# Patient Record
Sex: MALE | Race: WHITE | NOT HISPANIC OR LATINO | Employment: OTHER | URBAN - NONMETROPOLITAN AREA
[De-identification: names, ages, dates, MRNs, and addresses within clinical notes are randomized per-mention and may not be internally consistent; named-entity substitution may affect disease eponyms.]

---

## 2017-10-12 ENCOUNTER — TELEPHONE (OUTPATIENT)
Dept: CARDIAC SURGERY | Facility: CLINIC | Age: 72
End: 2017-10-12

## 2017-10-12 NOTE — TELEPHONE ENCOUNTER
PT CALLING TO SCHEDULE YEARLY F/U IN [place], WOULD PREFER SCAN IN [place]. PT WILL BE OUT OF TOWN 10/16/17. PLEASE BOOK SOMETIME AROUND END OF MONTH OR 1ST OF November.

## 2017-10-13 DIAGNOSIS — I71.40 ABDOMINAL AORTIC ANEURYSM (AAA) WITHOUT RUPTURE (HCC): Primary | ICD-10-CM

## 2017-10-16 DIAGNOSIS — I71.40 ABDOMINAL AORTIC ANEURYSM (AAA) WITHOUT RUPTURE (HCC): Primary | ICD-10-CM

## 2017-11-28 ENCOUNTER — OFFICE VISIT (OUTPATIENT)
Dept: CARDIAC SURGERY | Facility: CLINIC | Age: 72
End: 2017-11-28

## 2017-11-28 VITALS
HEIGHT: 72 IN | BODY MASS INDEX: 25.73 KG/M2 | DIASTOLIC BLOOD PRESSURE: 81 MMHG | WEIGHT: 190 LBS | HEART RATE: 81 BPM | SYSTOLIC BLOOD PRESSURE: 161 MMHG

## 2017-11-28 DIAGNOSIS — I71.21 ASCENDING AORTIC ANEURYSM (HCC): ICD-10-CM

## 2017-11-28 DIAGNOSIS — I71.40 ABDOMINAL AORTIC ANEURYSM WITHOUT RUPTURE (HCC): Primary | ICD-10-CM

## 2017-11-28 PROCEDURE — 99213 OFFICE O/P EST LOW 20 MIN: CPT | Performed by: THORACIC SURGERY (CARDIOTHORACIC VASCULAR SURGERY)

## 2017-12-01 ENCOUNTER — TRANSCRIBE ORDERS (OUTPATIENT)
Dept: CARDIAC SURGERY | Facility: CLINIC | Age: 72
End: 2017-12-01

## 2017-12-01 DIAGNOSIS — I71.20 THORACIC AORTIC ANEURYSM WITHOUT RUPTURE (HCC): Primary | ICD-10-CM

## 2017-12-14 ENCOUNTER — TRANSCRIBE ORDERS (OUTPATIENT)
Dept: CARDIAC SURGERY | Facility: CLINIC | Age: 72
End: 2017-12-14

## 2017-12-14 DIAGNOSIS — I71.40 ABDOMINAL AORTIC ANEURYSM (AAA) WITHOUT RUPTURE (HCC): Primary | ICD-10-CM

## 2018-01-11 ENCOUNTER — TELEPHONE (OUTPATIENT)
Dept: CARDIAC SURGERY | Facility: CLINIC | Age: 73
End: 2018-01-11

## 2018-01-11 NOTE — TELEPHONE ENCOUNTER
Mr Law called to ask about getting clearance for eye surgery. I told him that we did not do cardiac clearances but he said he thought it was for his aneurysm. I told him I would call his eye doctors office to find out what they needed. He gave me the number and told me to speak to Janice. I talked to Janice ([phone]) and she confirmed that they needed cardiac clearance. I called Mr Law back, he stated that he would make an appointment with his cardiologist.

## 2018-01-15 ENCOUNTER — TRANSCRIBE ORDERS (OUTPATIENT)
Dept: CARDIAC SURGERY | Facility: CLINIC | Age: 73
End: 2018-01-15

## 2018-01-15 DIAGNOSIS — I71.40 ABDOMINAL AORTIC ANEURYSM (AAA) WITHOUT RUPTURE (HCC): Primary | ICD-10-CM

## 2018-05-09 ENCOUNTER — TELEPHONE (OUTPATIENT)
Dept: CARDIAC SURGERY | Facility: CLINIC | Age: 73
End: 2018-05-09

## 2018-05-15 ENCOUNTER — TELEPHONE (OUTPATIENT)
Dept: CARDIAC SURGERY | Facility: CLINIC | Age: 73
End: 2018-05-15

## 2018-05-15 NOTE — TELEPHONE ENCOUNTER
Pt called regarding recall letter. Verified pt's address, phone number and insurance. Pt would like to be seen in [place]

## 2018-05-25 DIAGNOSIS — I71.20 THORACIC AORTIC ANEURYSM WITHOUT RUPTURE (HCC): Primary | ICD-10-CM

## 2018-07-03 ENCOUNTER — OFFICE VISIT (OUTPATIENT)
Dept: CARDIAC SURGERY | Facility: CLINIC | Age: 73
End: 2018-07-03

## 2018-07-03 VITALS
BODY MASS INDEX: 26.14 KG/M2 | DIASTOLIC BLOOD PRESSURE: 90 MMHG | OXYGEN SATURATION: 96 % | SYSTOLIC BLOOD PRESSURE: 150 MMHG | HEIGHT: 72 IN | HEART RATE: 77 BPM | TEMPERATURE: 98.9 F | WEIGHT: 193 LBS

## 2018-07-03 DIAGNOSIS — I71.21 ASCENDING AORTIC ANEURYSM (HCC): Primary | ICD-10-CM

## 2018-07-03 PROCEDURE — 99214 OFFICE O/P EST MOD 30 MIN: CPT | Performed by: PHYSICIAN ASSISTANT

## 2018-07-03 PROCEDURE — 99406 BEHAV CHNG SMOKING 3-10 MIN: CPT | Performed by: PHYSICIAN ASSISTANT

## 2018-07-03 NOTE — PROGRESS NOTES
07/03/2018  Patient Information  Segundo Law                                                                                          [address]  [place] [address]   1945  'PCP/Referring Physician'  NATALY Mcarthur MD  [phone]  No ref. provider found    Chief Complaint   Patient presents with   • Follow-up     6 MO FU with CT Chest for TAA   • Thoracic Aneurysm       History of Present Illness:  Patient is a 72-year-old  male with history of hypertension, hyperlipidemia, atrial fibrillation, chronic kidney disease with renal cancer and left nephrectomy, coronary artery disease status post CABG/stents, redo endovascular AAA repair and thoracic aortic aneurysm who presents to office today for interval follow-up with CT scan of the chest.  Mr. Law was last seen on 11/28/17 and complains of interval epigastric pain, shortness of breath and lower back pain, but denies any chest pain, hemoptysis or weight loss.  The patient is down to 6 cigarettes per day, but he and I had a 3-5 minute conversation about the importance of tobacco cessation.  The patient's previous measurement of his thoracic aortic aneurysm was 4.3 cm.  I personally reviewed his most recent CT scan, and discussed the results with he and his wife.  The patient is otherwise doing well.    Patient Active Problem List   Diagnosis   • Abdominal aortic aneurysm without rupture status post repeat endograft 7/20/2016   • PVD (peripheral vascular disease) (CMS/HCC)   • MUNIR (acute kidney injury)/ on chronic kidney disease   • GERD (gastroesophageal reflux disease)   • Tobacco abuse   • Chronic Atrial fibrillation on Pradaxa prior to admission   • Renal cell cancer with previous left nephrectomy   • Coronary artery disease with previous CABG and stents   • Acute blood loss anemia postop now stable   • Ascending aortic aneurysm (CMS/HCC)     Past Medical History:   Diagnosis Date   • Acute confusion due to medical condition      some confusion after lass surgery while in icu    • Aneurysm (CMS/HCC)     S/p redo Endovascular repair 7/20/16   • Atrial fibrillation (CMS/HCC)    • Cancer (CMS/HCC)     Renal cell carcinoma   • Coronary artery disease    • Dyslipidemia    • Enlarged prostate    • Full dentures    • Hearing loss of both ears     from work    • Heart burn    • Hypertension    • Kidney disease     only one kidney    • Pneumonia     h/o   • Wears glasses      Past Surgical History:   Procedure Laterality Date   • ABDOMINAL AORTIC ANEURYSM REPAIR WITH ENDOGRAFT      Endologic graft, 7/17/12 in [place], Dr. Ej Spring   • AORTAGRAM N/A 7/20/2016    Procedure: Aortogram with possible stenting ;  Surgeon: Lefty Villegas MD;  Location: [facility] HYBRID OR 15;  Service:    • ARTERIAL ANEURYSM REPAIR     • CHOLECYSTECTOMY     • COLONOSCOPY     • CORONARY ARTERY BYPASS GRAFT      x2   • CORONARY STENT PLACEMENT      1 stent    • ENDOSCOPY     • NEPHRECTOMY Left    • [place] AAA REPAIR,AORTO-AORTIC TUBE PROSTH N/A 7/20/2016    Procedure: Abdominal Aortic Aneurysm Repair With Endograft;  Surgeon: Lefty Villegas MD;  Location:  [facility] HYBRID OR 15;  Service: Vascular   • TONSILLECTOMY         Current Outpatient Prescriptions:   •  carvedilol (COREG) 12.5 MG tablet, Take 12.5 mg by mouth 2 (two) times a day., Disp: , Rfl: 0  •  CRESTOR 10 MG tablet, Take 10 mg by mouth daily., Disp: , Rfl: 0  •  diazepam (VALIUM) 5 MG tablet, Take 5 mg by mouth every 6 (six) hours as needed for anxiety., Disp: , Rfl: 0  •  DIGITEK 250 MCG tablet, Take 125 mcg by mouth every day., Disp: , Rfl: 0  •  fenofibrate (TRICOR) 145 MG tablet, Take 145 mg by mouth daily., Disp: , Rfl: 0  •  omega-3 acid ethyl esters (LOVAZA) 1 G capsule, Take 1 g by mouth daily., Disp: , Rfl: 0  •  PRADAXA 75 MG capsule, Take 75 mg by mouth 2 (two) times a day. Instructed to stop 7/18 per dr villegas office , Disp: , Rfl: 0  •  ranitidine (ZANTAC) 150 MG tablet, Take 150 mg by mouth daily as  needed for heartburn., Disp: , Rfl: 0  Allergies   Allergen Reactions   • Nexium [Esomeprazole Magnesium] Other (See Comments)     Rapid Heartrate and A-Fib   • Adhesive Tape Rash   • Latex Rash     Social History     Social History   • Marital status:      Spouse name: N/A   • Number of children: 2   • Years of education: N/A     Occupational History   • Retired       Social History Main Topics   • Smoking status: Current Every Day Smoker     Packs/day: 0.50     Years: 55.00     Types: Cigarettes   • Smokeless tobacco: Never Used      Comment: Currently 6 Cigarettes Daily   • Alcohol use No   • Drug use: No   • Sexual activity: Not on file     Other Topics Concern   • Not on file     Social History Narrative    Lives in [place]     Family History   Problem Relation Age of Onset   • Heart failure Mother    • Other Father         was murdered in 1971     Review of Systems   Constitution: Negative for chills, fever, malaise/fatigue, night sweats and weight loss.   HENT: Positive for hearing loss. Negative for odynophagia and sore throat.    Cardiovascular: Positive for dyspnea on exertion. Negative for chest pain, leg swelling, orthopnea and palpitations.   Respiratory: Negative for cough and hemoptysis.    Endocrine: Negative for cold intolerance, heat intolerance, polydipsia, polyphagia and polyuria.   Hematologic/Lymphatic: Bruises/bleeds easily.   Skin: Negative for itching and rash.   Musculoskeletal: Positive for back pain. Negative for joint pain, joint swelling and myalgias.   Gastrointestinal: Positive for abdominal pain. Negative for constipation, diarrhea, hematemesis, hematochezia, melena, nausea and vomiting.   Genitourinary: Negative for dysuria, frequency and hematuria.   Neurological: Negative for focal weakness, headaches, numbness and seizures.   Psychiatric/Behavioral: Negative for suicidal ideas. The patient is nervous/anxious.    All other systems reviewed and are  "negative.    Vitals:    18 1423   BP: 150/90   BP Location: Left arm   Patient Position: Sitting   Pulse: 77   Temp: 98.9 °F (37.2 °C)   SpO2: 96%   Weight: 87.5 kg (193 lb)   Height: 182.9 cm (72\")      Physical Exam:  Gen - NAD, pleasant, cooperative  CV - Regular rate and rhythm, no murmur gallop or rub  Pulm - Lungs clear to auscultation without wheeze or rhonchi   GI - Soft, normoactive bowel sounds, non-tender  Ext - Without edema    Labs/Imagin18 CT chest without contrast  The ascending thoracic aorta measures up to 4.8 cm in greatest axial dimension, which is larger than prior CT report from outside institution.    Assessment/Plan:  Patient is a 72-year-old  male with history of hypertension, hyperlipidemia, atrial fibrillation, chronic kidney disease with renal cancer and left nephrectomy, coronary artery disease status post CABG/stents, redo endovascular AAA repair and thoracic aortic aneurysm who presents to office today for interval follow-up with CT scan of the chest.  I personally reviewed the patient's CT chest scan, which was performed on 18 at [facility], which revealed a 5 mm interval increase in thoracic aortic aneurysm from 4.3 to 4.8cm per Radiology report.  I personally measured what I believed to be the greatest axial dimension of the thoracic aorta, and my measurement was closer to 4.5 cm, which means the interval increase is actually closer to 2 mm.  I discussed this with the patient and his wife.  Regardless, the patient does not meet criteria for repair.  I would like for the patient to follow-up with our office in 6 months with CT scan of the chest, abdomen and pelvis.  If he develops any acutely worsening chest pain, upper back pain or abdominal pain he should present to the nearest emergency department immediately.  I would like for him to discuss his cardiovascular medications with his primary care provider, as his blood pressure is elevated at 150/90 " today.  I also encouraged him to keep a daily log of his blood pressure at home.  I discouraged the patient from straining or lifting anything heavier than 20 pounds.  The patient will follow-up in 6 months, if he has any questions or concerns during the interval, he may call our office at any time.        Patient Active Problem List   Diagnosis   • Abdominal aortic aneurysm without rupture status post repeat endograft 7/20/2016   • PVD (peripheral vascular disease) (CMS/[facility])   • MUNIR (acute kidney injury)/ on chronic kidney disease   • GERD (gastroesophageal reflux disease)   • Tobacco abuse   • Chronic Atrial fibrillation on Pradaxa prior to admission   • Renal cell cancer with previous left nephrectomy   • Coronary artery disease with previous CABG and stents   • Acute blood loss anemia postop now stable   • Ascending aortic aneurysm (CMS/[facility])     Signed by:

## 2019-02-11 ENCOUNTER — TELEPHONE (OUTPATIENT)
Dept: CARDIAC SURGERY | Facility: CLINIC | Age: 74
End: 2019-02-11

## 2019-03-21 ENCOUNTER — TELEPHONE (OUTPATIENT)
Dept: CARDIAC SURGERY | Facility: CLINIC | Age: 74
End: 2019-03-21

## 2019-03-21 NOTE — TELEPHONE ENCOUNTER
3rd attempt phone call for 6m f/u with Dr. Villegas. Pt stated his wife is sick and will call us when he is able to make an apt.

## 2019-07-10 ENCOUNTER — TELEPHONE (OUTPATIENT)
Dept: CARDIAC SURGERY | Facility: CLINIC | Age: 74
End: 2019-07-10

## 2019-07-15 DIAGNOSIS — I71.40 ABDOMINAL AORTIC ANEURYSM WITHOUT RUPTURE (HCC): Primary | ICD-10-CM

## 2019-07-15 DIAGNOSIS — I71.21 ASCENDING AORTIC ANEURYSM (HCC): Primary | ICD-10-CM

## 2019-09-03 ENCOUNTER — OFFICE VISIT (OUTPATIENT)
Dept: CARDIAC SURGERY | Facility: CLINIC | Age: 74
End: 2019-09-03

## 2019-09-03 VITALS
SYSTOLIC BLOOD PRESSURE: 146 MMHG | WEIGHT: 186 LBS | HEIGHT: 72 IN | DIASTOLIC BLOOD PRESSURE: 77 MMHG | OXYGEN SATURATION: 98 % | TEMPERATURE: 98.2 F | HEART RATE: 69 BPM | BODY MASS INDEX: 25.19 KG/M2

## 2019-09-03 DIAGNOSIS — I71.40 ABDOMINAL AORTIC ANEURYSM WITHOUT RUPTURE (HCC): Primary | ICD-10-CM

## 2019-09-03 PROCEDURE — 99406 BEHAV CHNG SMOKING 3-10 MIN: CPT | Performed by: PHYSICIAN ASSISTANT

## 2019-09-03 PROCEDURE — 99213 OFFICE O/P EST LOW 20 MIN: CPT | Performed by: PHYSICIAN ASSISTANT

## 2019-09-03 NOTE — PROGRESS NOTES
09/03/2019  Patient Information  Segundo MARTINEZ  [place] [address]   1945  'PCP/Referring Physician'  Segundo Mcarthur MD  [phone]  No ref. provider found    Chief Complaint   Patient presents with   • Aortic Aneurysm     1 year follow-up with results from CT abdomen/pelvis for abdominal aortic aneurysm       History of Present Illness:  Patient is a 73-year-old  male with history of hypertension, hyperlipidemia, atrial fibrillation, chronic kidney disease with renal cancer and left nephrectomy, coronary artery disease status post CABG/stents, redo endovascular AAA repair and thoracic aortic aneurysm who presents to office for review and discussion of recent CT scan of the chest, abdomen and pelvis.  The patient was last seen on 7/3/2018 and denies any chest pain, difficulty with ambulation or hemoptysis but does complain of occasional abdominal and back pain as well as shortness of breath with exertion.  The patient also notes unintentional weight loss of 8 to 10 pounds since his previous office visit.  The patient continues to smoke 0.5 packs of cigarettes per day and is otherwise doing well.  His blood pressure is 146/77 today, for which I encouraged him to discuss this elevated blood pressure with his primary care provider.    Patient Active Problem List   Diagnosis   • Abdominal aortic aneurysm without rupture status post repeat endograft 7/20/2016   • PVD (peripheral vascular disease) (CMS/HCC)   • MUNIR (acute kidney injury)/ on chronic kidney disease   • GERD (gastroesophageal reflux disease)   • Tobacco abuse   • Chronic Atrial fibrillation on Pradaxa prior to admission   • Renal cell cancer with previous left nephrectomy   • Coronary artery disease with previous CABG and stents   • Acute blood loss anemia postop now stable   • Ascending aortic aneurysm (CMS/HCC)     Past  Medical History:   Diagnosis Date   • Acute confusion due to medical condition     some confusion after lass surgery while in [facility]    • Aneurysm (CMS/HCC)     S/p redo Endovascular repair 7/20/16   • Atrial fibrillation (CMS/HCC)    • Cancer (CMS/HCC)     Renal cell carcinoma   • Coronary artery disease    • Dyslipidemia    • Enlarged prostate    • Full dentures    • Hearing loss of both ears     from work    • Heart burn    • Hypertension    • Kidney disease     only one kidney    • Pneumonia     h/o   • Wears glasses      Past Surgical History:   Procedure Laterality Date   • ABDOMINAL AORTIC ANEURYSM REPAIR WITH ENDOGRAFT      Endologic graft, 7/17/12 in [place], Dr. Ej Spring   • AORTAGRAM N/A 7/20/2016    Procedure: Aortogram with possible stenting ;  Surgeon: Lefty Villegas MD;  Location: [facility] OR ;  Service:    • ARTERIAL ANEURYSM REPAIR     • CHOLECYSTECTOMY     • COLONOSCOPY     • CORONARY ARTERY BYPASS GRAFT      x2   • CORONARY STENT PLACEMENT      1 stent    • ENDOSCOPY     • NEPHRECTOMY Left    • [place] AAA REPAIR,AORTO-AORTIC TUBE PROSTH N/A 7/20/2016    Procedure: Abdominal Aortic Aneurysm Repair With Endograft;  Surgeon: Lefty Villegas MD;  Location: [facility] OR ;  Service: Vascular   • TONSILLECTOMY         Current Outpatient Medications:   •  carvedilol (COREG) 12.5 MG tablet, Take 12.5 mg by mouth 2 (two) times a day., Disp: , Rfl: 0  •  CRESTOR 10 MG tablet, Take 10 mg by mouth daily., Disp: , Rfl: 0  •  DIGITEK 250 MCG tablet, Take 125 mcg by mouth every day., Disp: , Rfl: 0  •  fenofibrate (TRICOR) 145 MG tablet, Take 145 mg by mouth daily., Disp: , Rfl: 0  •  PRADAXA 75 MG capsule, Take 75 mg by mouth 2 (two) times a day. Instructed to stop 7/18 per dr villegas office , Disp: , Rfl: 0  •  ranitidine (ZANTAC) 150 MG tablet, Take 150 mg by mouth daily as needed for heartburn., Disp: , Rfl: 0  •  diazepam (VALIUM) 5 MG tablet, Take 5 mg by mouth every 6 (six) hours as needed for  anxiety., Disp: , Rfl: 0  •  omega-3 acid ethyl esters (LOVAZA) 1 G capsule, Take 1 g by mouth daily., Disp: , Rfl: 0  Allergies   Allergen Reactions   • Nexium [Esomeprazole Magnesium] Other (See Comments)     Rapid Heartrate and A-Fib   • Adhesive Tape Rash   • Latex Rash     Social History     Socioeconomic History   • Marital status:      Spouse name: Not on file   • Number of children: 2   • Years of education: Not on file   • Highest education level: Not on file   Occupational History   • Occupation: Retired    Tobacco Use   • Smoking status: Current Every Day Smoker     Packs/day: 0.50     Years: 55.00     Pack years: 27.50     Types: Cigarettes   • Smokeless tobacco: Never Used   • Tobacco comment: Currently 6 Cigarettes Daily   Substance and Sexual Activity   • Alcohol use: No   • Drug use: No   Social History Narrative    Lives in [place]     Family History   Problem Relation Age of Onset   • Heart failure Mother    • Other Father         was murdered in 1971     Review of Systems   Constitution: Negative for chills, fever, malaise/fatigue, night sweats and weight loss.   HENT: Negative for hearing loss, odynophagia and sore throat.    Cardiovascular: Positive for dyspnea on exertion. Negative for chest pain, leg swelling, orthopnea and palpitations.   Respiratory: Negative for cough and hemoptysis.    Endocrine: Negative for cold intolerance, heat intolerance, polydipsia, polyphagia and polyuria.   Hematologic/Lymphatic: Bruises/bleeds easily.   Skin: Negative for itching and rash.   Musculoskeletal: Positive for back pain and muscle cramps. Negative for joint pain, joint swelling and myalgias.   Gastrointestinal: Positive for abdominal pain and diarrhea. Negative for constipation, hematemesis, hematochezia, melena, nausea and vomiting.   Genitourinary: Negative for dysuria, frequency and hematuria.   Neurological: Negative for focal weakness, headaches, numbness and seizures.  "  Psychiatric/Behavioral: Positive for depression. Negative for suicidal ideas. The patient is not nervous/anxious.    All other systems reviewed and are negative.    Vitals:    09/03/19 1212   BP: 146/77   BP Location: Right arm   Patient Position: Sitting   Pulse: 69   Temp: 98.2 °F (36.8 °C)   TempSrc: Temporal   SpO2: 98%   Weight: 84.4 kg (186 lb)   Height: 182.9 cm (72\")      Physical Exam:  Gen - NAD, pleasant, cooperative  CV - Regular rate and rhythm, no murmur gallop or rub  Pulm - Lungs clear to auscultation without wheeze or rhonchi   GI - Soft, normoactive bowel sounds, non-tender  Ext - Without edema  Neuro - CN II - XII grossly intact, tongue midline, voice normal    Labs/Imaging:  CT chest without contrast performed 7/18/2019 at [facility]  Ascending thoracic aortic aneurysm measured at 4.8 cm in the greatest axial dimension.  Not significantly changed since prior exam.  No definitive evidence of leak or dissection on this noncontrast exam.    CT scan abdomen/pelvis performed 7/18/2019 at [facility]  Patient is status post aortobiiliac grafting with a large infrarenal abdominal aortic aneurysm measuring up to 6.7 cm in greatest axial dimension.  This is slightly larger than the prior exam.  There is no evidence of leak on this noncontrast exam.  Of note, the prostate is enlarged.  Recommend correlation with serum PSA and physical exam.    Assessment/Plan:  Patient is a 73-year-old  male with history of hypertension, hyperlipidemia, atrial fibrillation, chronic kidney disease with renal cancer and left nephrectomy, coronary artery disease status post CABG/stents, redo endovascular AAA repair and thoracic aortic aneurysm who presents to office for review and discussion of recent CT scan of the chest, abdomen and pelvis.  The patient has been doing well since last being seen in office.  I personally reviewed his recent CT scan of the chest, abdomen and pelvis, and " discussed the results with the patient, answered all questions to his satisfaction.  Due to the fact that his scans were noncontrast, I would like for the patient to receive a CMP to check his creatinine and move forward with a CT scan of the abdomen/pelvis with contrast, if his creatinine is reasonable.  I spent 3 to 5 minutes discussing the importance of tobacco cessation with the patient.  He will follow-up with our office after his CT scan of the abdomen/pelvis has been performed, if his creatinine is again, within reason.  The patient should call with any questions, concerns or acutely worsening symptoms, should the patient develop any prior to being seen in our office.    Patient Active Problem List   Diagnosis   • Abdominal aortic aneurysm without rupture status post repeat endograft 7/20/2016   • PVD (peripheral vascular disease) (CMS/HCC)   • MUNIR (acute kidney injury)/ on chronic kidney disease   • GERD (gastroesophageal reflux disease)   • Tobacco abuse   • Chronic Atrial fibrillation on Pradaxa prior to admission   • Renal cell cancer with previous left nephrectomy   • Coronary artery disease with previous CABG and stents   • Acute blood loss anemia postop now stable   • Ascending aortic aneurysm (CMS/HCC)

## 2019-09-19 DIAGNOSIS — Z00.6 EXAMINATION FOR NORMAL COMPARISON FOR CLINICAL RESEARCH: Primary | ICD-10-CM

## 2019-10-29 ENCOUNTER — OFFICE VISIT (OUTPATIENT)
Dept: CARDIAC SURGERY | Facility: CLINIC | Age: 74
End: 2019-10-29

## 2019-10-29 VITALS
DIASTOLIC BLOOD PRESSURE: 97 MMHG | OXYGEN SATURATION: 99 % | WEIGHT: 188 LBS | HEIGHT: 72 IN | TEMPERATURE: 98.2 F | SYSTOLIC BLOOD PRESSURE: 141 MMHG | HEART RATE: 70 BPM | BODY MASS INDEX: 25.47 KG/M2

## 2019-10-29 DIAGNOSIS — I71.40 ABDOMINAL AORTIC ANEURYSM WITHOUT RUPTURE (HCC): Primary | ICD-10-CM

## 2019-10-29 PROCEDURE — 99212 OFFICE O/P EST SF 10 MIN: CPT | Performed by: PHYSICIAN ASSISTANT

## 2019-10-29 RX ORDER — DIGOXIN 125 UG/1
125 TABLET ORAL
Refills: 0 | COMMUNITY
Start: 2019-09-14 | End: 2022-08-11

## 2019-10-29 RX ORDER — TAMSULOSIN HYDROCHLORIDE 0.4 MG/1
1 CAPSULE ORAL DAILY
Refills: 0 | COMMUNITY
Start: 2019-10-17 | End: 2019-11-22

## 2019-10-29 NOTE — PROGRESS NOTES
10/29/2019  Patient Information  Segundo Stinson [address]  [place] [address]   1945  'PCP/Referring Physician'  Segundo Mcarthur MD  [phone]  No ref. provider found    Chief Complaint   Patient presents with   • Follow-up     Follow up after CT Abdomen / Pelvis for AAA   • Aortic Aneurysm       History of Present Illness:  Patient is a 73-year-old  male with history of hypertension, hyperlipidemia, atrial fibrillation, chronic kidney disease with renal cancer and left nephrectomy, coronary artery disease status post CABG/stents, redo endovascular AAA repair and thoracic aortic aneurysm who presents to office for review and discussion of recent CT scan of the abdomen and pelvis.  The patient was last seen on 9/3/2019 and denies any chest pain, difficulty with ambulation or hemoptysis but does complain of occasional abdominal and back pain as well as shortness of breath with exertion.  Otherwise he is doing well.    Patient Active Problem List   Diagnosis   • Abdominal aortic aneurysm without rupture status post repeat endograft 7/20/2016   • PVD (peripheral vascular disease) (CMS/HCC)   • MUNIR (acute kidney injury)/ on chronic kidney disease   • GERD (gastroesophageal reflux disease)   • Tobacco abuse   • Chronic Atrial fibrillation on Pradaxa prior to admission   • Renal cell cancer with previous left nephrectomy   • Coronary artery disease with previous CABG and stents   • Acute blood loss anemia postop now stable   • Ascending aortic aneurysm (CMS/HCC)     Past Medical History:   Diagnosis Date   • Acute confusion due to medical condition     some confusion after lass surgery while in icu    • Aneurysm (CMS/HCC)     S/p redo Endovascular repair 7/20/16   • Atrial fibrillation (CMS/HCC)    • Cancer (CMS/HCC)     Renal cell carcinoma   • Coronary artery disease    • Dyslipidemia    • Enlarged  prostate    • Full dentures    • Hearing loss of both ears     from work    • Heart burn    • Hypertension    • Kidney disease     only one kidney    • Pneumonia     h/o   • Wears glasses      Past Surgical History:   Procedure Laterality Date   • ABDOMINAL AORTIC ANEURYSM REPAIR WITH ENDOGRAFT      Endologic graft, 7/17/12 in [place], Dr. Ej Spring   • AORTAGRAM N/A 7/20/2016    Procedure: Aortogram with possible stenting ;  Surgeon: Lefty Villegas MD;  Location: [facility] OR ;  Service:    • ARTERIAL ANEURYSM REPAIR     • CHOLECYSTECTOMY     • COLONOSCOPY     • CORONARY ARTERY BYPASS GRAFT      x2   • CORONARY STENT PLACEMENT      1 stent    • ENDOSCOPY     • NEPHRECTOMY Left    • MA AAA REPAIR,AORTO-AORTIC TUBE PROSTH N/A 7/20/2016    Procedure: Abdominal Aortic Aneurysm Repair With Endograft;  Surgeon: Lefty Villegas MD;  Location: [facility] OR 15;  Service: Vascular   • TONSILLECTOMY         Current Outpatient Medications:   •  carvedilol (COREG) 12.5 MG tablet, Take 12.5 mg by mouth 2 (two) times a day., Disp: , Rfl: 0  •  CRESTOR 10 MG tablet, Take 10 mg by mouth daily., Disp: , Rfl: 0  •  diazepam (VALIUM) 5 MG tablet, Take 5 mg by mouth every 6 (six) hours as needed for anxiety., Disp: , Rfl: 0  •  DIGOX 125 MCG tablet, , Disp: , Rfl: 0  •  fenofibrate (TRICOR) 145 MG tablet, Take 145 mg by mouth daily., Disp: , Rfl: 0  •  omega-3 acid ethyl esters (LOVAZA) 1 G capsule, Take 1 g by mouth daily., Disp: , Rfl: 0  •  PRADAXA 75 MG capsule, Take 75 mg by mouth 2 (two) times a day. Instructed to stop 7/18 per dr villegas office , Disp: , Rfl: 0  •  ranitidine (ZANTAC) 150 MG tablet, Take 150 mg by mouth daily as needed for heartburn., Disp: , Rfl: 0  •  DIGITEK 250 MCG tablet, Take 125 mcg by mouth every day., Disp: , Rfl: 0  •  tamsulosin (FLOMAX) 0.4 MG capsule 24 hr capsule, Take 1 capsule by mouth Daily., Disp: , Rfl: 0  Allergies   Allergen Reactions   • Nexium [Esomeprazole Magnesium] Other  (See Comments)     Rapid Heartrate and A-Fib   • Adhesive Tape Rash   • Latex Rash     Social History     Socioeconomic History   • Marital status:      Spouse name: Not on file   • Number of children: 2   • Years of education: Not on file   • Highest education level: Not on file   Occupational History   • Occupation: Retired    Tobacco Use   • Smoking status: Current Every Day Smoker     Packs/day: 0.50     Years: 55.00     Pack years: 27.50     Types: Cigarettes   • Smokeless tobacco: Never Used   • Tobacco comment: Currently 6 Cigarettes Daily   Substance and Sexual Activity   • Alcohol use: No   • Drug use: No   Social History Narrative    Lives in [place]     Family History   Problem Relation Age of Onset   • Heart failure Mother    • Other Father         was murdered in 1971     Review of Systems   Constitution: Negative for chills, diaphoresis, fever, malaise/fatigue and weight loss.   HENT: Negative for congestion, hearing loss, hoarse voice and sore throat.    Eyes: Negative for blurred vision and double vision.   Cardiovascular: Positive for claudication, dyspnea on exertion and leg swelling. Negative for chest pain, palpitations and syncope.   Respiratory: Negative for cough, hemoptysis, shortness of breath and wheezing.    Hematologic/Lymphatic: Bruises/bleeds easily.   Skin: Negative for itching, poor wound healing and rash.   Musculoskeletal: Positive for arthritis, back pain, muscle weakness and neck pain. Negative for falls, joint pain, joint swelling and muscle cramps.   Gastrointestinal: Positive for abdominal pain. Negative for constipation, diarrhea, hematemesis, nausea and vomiting.   Genitourinary: Negative for dysuria, frequency, hematuria, hesitancy, incomplete emptying and urgency.   Neurological: Negative for dizziness, headaches, light-headedness, loss of balance, numbness and vertigo.   Psychiatric/Behavioral: Negative for depression, memory loss and substance abuse.  "The patient is not nervous/anxious.    Allergic/Immunologic: Negative for environmental allergies and HIV exposure.     Vitals:    10/29/19 0935   BP: 141/97   Pulse: 70   Temp: 98.2 °F (36.8 °C)   TempSrc: Temporal   SpO2: 99%   Weight: 85.3 kg (188 lb)   Height: 182.9 cm (72\")      Physical Exam  Gen - NAD, pleasant, cooperative  CV -regular rate and rhythm, no murmur gallop or rub  Pulm -lungs clear to auscultation bilateral without wheeze or rhonchi  GI -soft, normoactive bowel sounds, nontender  Ext - without edema.   Incision -healing well, no evidence of incisional dehiscence or cellulitis  Neuro - CN II - XII grossly intact, tongue midline, voice normal    Labs/Imaging:  CT abdomen/pelvis 9/19/2019  Stent in place.  Stable 6.8 cm infrarenal AAA.    Assessment/Plan:  Patient is a 73-year-old  male with history of hypertension, hyperlipidemia, atrial fibrillation, chronic kidney disease with renal cancer and left nephrectomy, coronary artery disease status post CABG/stents, redo endovascular AAA repair and thoracic aortic aneurysm who presents to office for review and discussion of recent CT scan of the abdomen and pelvis.  Results of the scan were discussed in detail with the patient in office today and all questions were answered to his satisfaction.  At this time we will plan to see the patient back in office again in 1 year with a repeat CT scan of the chest and abdomen/pelvis for continued surveillance of his AAA repair and is ascending aortic aneurysm.  He may call our office at any time with any questions or concerns should any arise during the interval.    Patient Active Problem List   Diagnosis   • Abdominal aortic aneurysm without rupture status post repeat endograft 7/20/2016   • PVD (peripheral vascular disease) (CMS/HCC)   • MUNIR (acute kidney injury)/ on chronic kidney disease   • GERD (gastroesophageal reflux disease)   • Tobacco abuse   • Chronic Atrial fibrillation on Pradaxa prior to " admission   • Renal cell cancer with previous left nephrectomy   • Coronary artery disease with previous CABG and stents   • Acute blood loss anemia postop now stable   • Ascending aortic aneurysm (CMS/HCC)

## 2019-11-21 PROBLEM — I10 ESSENTIAL HYPERTENSION: Status: ACTIVE | Noted: 2019-11-21

## 2019-11-21 PROBLEM — H91.93 HEARING LOSS OF BOTH EARS: Status: ACTIVE | Noted: 2019-11-21

## 2019-11-21 PROBLEM — E78.2 MIXED HYPERLIPIDEMIA: Status: ACTIVE | Noted: 2019-11-21

## 2019-11-21 PROBLEM — N18.4 CKD (CHRONIC KIDNEY DISEASE) STAGE 4, GFR 15-29 ML/MIN (HCC): Status: ACTIVE | Noted: 2019-11-21

## 2019-11-21 NOTE — PROGRESS NOTES
"      Subjective   Segundo Law is a 73 y.o. male.  Primary Care: Gen, Nikolay Stout MD  Referring: Lefty Villegas MD  [address] [facility]  [place] [address] [place], [place] [address]      Chief Complaint   Patient presents with   • Atrial Fibrillation   • Chest Pain   • Dizziness   • Shortness of Breath     Patient Active Problem List    Diagnosis    • Angina, class III (CMS/HCC)    • Coronary artery disease with previous CABG and stent.    • PVD (peripheral vascular disease) with claudication (CMS/HCC)    • Permanent atrial fibrillation    • Accelerated hypertension    • Mixed hyperlipidemia    • Ascending aortic aneurysm (CMS/HCC)    • Tobacco abuse    • Abdominal aortic aneurysm without rupture status post repeat endograft 7/20/2016      S/p repeat endograft AAA 7/20/16 with reexploration for bleeding  Previous endograft July 2012 in [place]   • CKD (chronic kidney disease) stage 3, GFR 30-59 ml/min (CMS/HCC)    • Hearing loss of both ears    • GERD (gastroesophageal reflux disease)    • Renal cell cancer with previous left nephrectomy      S/p left nephrectomy. Baseline creatinine 2.5-3.0.     History of Present Illness   This is a 73-year-old hypertensive dyslipidemic male smoker with extensive cardiac history to include coronary artery bypass grafting in 1981 with redo bypass grafting in 1985 with a subsequent stent to unknown vessel circa 2001. He has had no ischemic studies since cardiac catheterization in 2001. He also has permanent atrial fibrillation, history of abdominal aortic aneurysm repair and a redo repair and further has an ascending thoracic aneurysm which is being monitored by CT surgery.  He presents to our office today for evaluation of accelerated hypertension.  He presented to [facility] emergency department last week with a blood pressure reported to be as high as 279 mmHg.  His wife tells me that \"all his medicines were doubled\".  Norvasc 5 mg daily was also added to his regimen.  Since " these changes he continues to have systolic blood pressures ranging from 160 to 170 mmHg with a single reading as low as 139 mmHg.  He is complaining of blurring of vision and swelling of both his lower extremities and eyes with onset prior to starting amlodipine.  He complains of exertional midsternal chest pressure which is moderate in intensity with ambulation of less than 50 feet.  He also has bilateral lower extremity claudication at the same distance.    He has permanent atrial fibrillation for which he is managed on digoxin and anticoagulated with Pradaxa.  He has no awareness of tachycardia no dizziness or syncope.  His wife reports that his heart rate generally runs between 80 to 90 bpm.    He has a history of renal carcinoma with subsequent nephrectomy and chronic kidney disease stage III-IV.  He has a nephrologist who he sees regularly.    Summary records reviewed:  · Emergency department note dated 11/15/2019 was reviewed at which time he presented with a complaint of high blood pressure generally running approximately 135/70 but reading 279/124 the day prior to presentation.  He reported compliance with his medical regimen.  Blood pressure that morning was 219/112.  He was advised by his primary care physician to present to the emergency department for further evaluation.  His blood pressure there was 194/100 with a pulse of 74 bpm.  His physical exam was unremarkable.  CBC, CMP, troponin I were all within normal range.  His EKG showed atrial fibrillation with ventricular rate of 74 and changes consistent with LVH.  He was given carvedilol 25 mg of hydralazine 25 mg as well as a 5 mg dose of amlodipine with a discharge blood pressure of 146/88  · Cardiology note from cardiology in [facility] dated 8/29/2019 was reviewed at which time he presented with a complaint of follow-up for abdominal aortic aneurysm.  He reported feeling overall well, reported no claudication, denied chest pain shortness of  breath orthopnea or PND.  He had no TIA or strokelike symptoms and reported no bleeding while on Pradaxa.  He complained of periodic mild confusion which was self-limiting.  His blood pressure that day was 164/92 with pulse of 77 bpm.  His physical exam was unremarkable EKG dated 11/15/2019 shows atrial fibrillation with a ventricular rate of 74  · Echocardiogram from 2/7/2019 was reviewed and shows mild concentric LVH with normal LV systolic function and an ejection fraction of 60 to 65%.  The left and right atrium was were moderately dilated.  The aortic valve is trileaflet but showed annular calcification there was no evidence of aortic regurgitation or stenosis.  There was moderate tricuspid regurgitation with RVSP of 40 mmHg.  There is mild dilatation of the ascending aorta, 4 cm.    Past Surgical History:   Procedure Laterality Date   • ABDOMINAL AORTIC ANEURYSM REPAIR WITH ENDOGRAFT      Endologic graft, 7/17/12 in [place], Dr. Ej Spring   • AORTAGRAM N/A 7/20/2016    Procedure: Aortogram with possible stenting ;  Surgeon: Lefty Villegas MD;  Location: [facility] OR ;  Service:    • ARTERIAL ANEURYSM REPAIR     • CHOLECYSTECTOMY     • COLONOSCOPY     • CORONARY ARTERY BYPASS GRAFT      x2   • CORONARY STENT PLACEMENT      1 stent    • ENDOSCOPY     • NEPHRECTOMY Left    • ME AAA REPAIR,AORTO-AORTIC TUBE PROSTH N/A 7/20/2016    Procedure: Abdominal Aortic Aneurysm Repair With Endograft;  Surgeon: Lefty Villegas MD;  Location: [place] [address];  Service: Vascular   • TONSILLECTOMY         The following portions of the patient's history were reviewed and updated as appropriate: allergies, current medications, past family history, past medical history, past social history, past surgical history and problem list.    Allergies   Allergen Reactions   • Nexium [Esomeprazole Magnesium] Other (See Comments)     Rapid Heartrate and A-Fib   • Adhesive Tape Rash   • Latex Rash         Current Outpatient  Medications:   •  amLODIPine (NORVASC) 5 MG tablet, Take 5 mg by mouth Daily., Disp: , Rfl:   •  carvedilol (COREG) 12.5 MG tablet, Take 12.5 mg by mouth 2 (two) times a day., Disp: , Rfl: 0  •  CRESTOR 10 MG tablet, Take 10 mg by mouth daily., Disp: , Rfl: 0  •  DIGOX 125 MCG tablet, Take 125 mcg by mouth Daily., Disp: , Rfl: 0  •  fenofibrate (TRICOR) 145 MG tablet, Take 145 mg by mouth daily., Disp: , Rfl: 0  •  PRADAXA 75 MG capsule, Take 75 mg by mouth 2 (two) times a day. Instructed to stop 7/18 per dr alegre office , Disp: , Rfl: 0  •  ranitidine (ZANTAC) 150 MG tablet, Take 150 mg by mouth daily as needed for heartburn., Disp: , Rfl: 0    Review of Systems   Constitution: Positive for malaise/fatigue.   HENT: Positive for hearing loss.    Eyes: Positive for blurred vision.   Cardiovascular: Positive for chest pain, claudication, dyspnea on exertion and leg swelling. Negative for near-syncope, orthopnea, palpitations, paroxysmal nocturnal dyspnea and syncope.   Respiratory: Positive for cough and shortness of breath.    Endocrine: Negative.    Hematologic/Lymphatic: Negative.    Skin: Negative.    Musculoskeletal: Positive for arthritis.   Gastrointestinal: Positive for abdominal pain.   Genitourinary: Negative.    Neurological: Negative.    Psychiatric/Behavioral: The patient is nervous/anxious.    Allergic/Immunologic: Negative.    All other systems reviewed and are negative.      Social History     Socioeconomic History   • Marital status:      Spouse name: Not on file   • Number of children: 2   • Years of education: Not on file   • Highest education level: Not on file   Occupational History   • Occupation: Retired    Tobacco Use   • Smoking status: Current Every Day Smoker     Packs/day: 1.00     Years: 55.00     Pack years: 55.00     Types: Cigarettes   • Smokeless tobacco: Never Used   Substance and Sexual Activity   • Alcohol use: No   • Drug use: No   • Sexual activity: Defer  "  Social History Narrative    Lives in [place]       Family History   Problem Relation Age of Onset   • Heart failure Mother    • Other Father         was murdered in 1971   • Breast cancer Sister    • No Known Problems Brother    • No Known Problems Sister    • No Known Problems Sister    • No Known Problems Brother    • No Known Problems Brother    • No Known Problems Brother    • No Known Problems Brother        Objective      /86 (BP Location: Left arm, Patient Position: Sitting)   Pulse 71   Ht 182.9 cm (72\")   Wt 88 kg (194 lb)   SpO2 96%   BMI 26.31 kg/m²     Physical Exam   Constitutional: He is oriented to person, place, and time. He appears well-developed and well-nourished.   HENT:   Head: Normocephalic and atraumatic.   Mouth/Throat: Oropharynx is clear and moist.   Eyes: EOM are normal. Pupils are equal, round, and reactive to light. No scleral icterus.   Neck: Neck supple. No JVD present. No thyromegaly present.   Cardiovascular: Normal rate and regular rhythm. Exam reveals no gallop and no friction rub.   Murmur heard.  Pulses:       Dorsalis pedis pulses are 0 on the right side, and 0 on the left side.        Posterior tibial pulses are 2+ on the right side, and 2+ on the left side.   Pulmonary/Chest: Breath sounds normal. No stridor. He has no wheezes. He has no rales.   Abdominal: Soft. Bowel sounds are normal. He exhibits no distension and no mass. There is no tenderness.   Musculoskeletal: Normal range of motion. He exhibits edema. He exhibits no tenderness or deformity.   Lymphadenopathy:     He has no cervical adenopathy.   Neurological: He is alert and oriented to person, place, and time. No cranial nerve deficit. Coordination normal.   Skin: Skin is warm and dry. No rash noted. No erythema.   Psychiatric: He has a normal mood and affect.   Vitals reviewed.        ECG 12 Lead  Date/Time: 11/22/2019 3:01 PM  Performed by: Juaquin Ferguson MD  Authorized by: Juaquin Ferguson MD   Rhythm: " atrial fibrillation  Rate: normal  BPM: 71  T inversion: I, II, III, aVF, V3, V4, V5 and V6  QRS axis: normal    Clinical impression: abnormal EKG            Lab Review: From emergency department visit date 11/16/2019:  CBC to BCs 6.81, hemoglobin 14.7, hematocrit 44.6, platelets 208  CMP: Glucose 88, BUN 35, creatinine 2.4, GFR 28, sodium 138, potassium 4.6, chloride 105, CO2 23, calcium 9.3, total protein 7.5, albumin 4.1, AST 30, ALT 17, alk phos 57  troponin I: 0.031        Assessment:     Diagnosis Plan   1. Angina, class III (CMS/[facility])  ECG 12 Lead    Add Imdur 60 mg daily   2. Coronary artery disease involving native coronary artery of native heart with angina pectoris (CMS/[facility])  medical treatment for angina   3. Permanent atrial fibrillation   continue Pradaxa and digoxin   4. Accelerated hypertension   increase carvedilol to 25 mg twice daily and add Imdur 60 mg daily.   5. Mixed hyperlipidemia     6. PVD (peripheral vascular disease) with claudication (CMS/[facility])   medical treatment   7. Tobacco abuse   counseled for less than 3 minutes      Plan:  Patient with multiple cardiovascular risk factors and history of CAD, PAD and chronic kidney disease with GFR of 28.  He presents with history of permanent atrial fibrillation with controlled rate and already on anticoagulation.  He reports exertional dyspnea and chest discomfort which is consistent with stable class II-III angina.  Due to his multiple medical conditions and fairly advanced kidney disease and in the setting of a normal ejection fraction based on echocardiogram earlier in the year we explained that continued medical therapy is the best strategy at this time.  He will continue current dose of carvedilol and amlodipine and we will further add Imdur 60 mg daily as additional antianginal therapy.  Continue all other current medications.  Follow-up in 1 month, sooner as needed.  Thank you for allowing us to participate in the care of your patient.      Scribed for Juaquin Ferguson MD by Electronically signed by Electronically signed by CINDI Smith, 11/22/19, 4:04 PM.    I, Juaquin Ferguson MD, personally performed the services described in this documentation as scribed by the above named individual in my presence, and it is both accurate and complete.  11/25/2019  5:08 PM

## 2019-11-22 ENCOUNTER — CONSULT (OUTPATIENT)
Dept: CARDIOLOGY | Facility: CLINIC | Age: 74
End: 2019-11-22

## 2019-11-22 VITALS
SYSTOLIC BLOOD PRESSURE: 152 MMHG | OXYGEN SATURATION: 96 % | BODY MASS INDEX: 26.28 KG/M2 | HEIGHT: 72 IN | HEART RATE: 71 BPM | DIASTOLIC BLOOD PRESSURE: 86 MMHG | WEIGHT: 194 LBS

## 2019-11-22 DIAGNOSIS — I73.9 PVD (PERIPHERAL VASCULAR DISEASE) WITH CLAUDICATION (HCC): ICD-10-CM

## 2019-11-22 DIAGNOSIS — I25.119 CORONARY ARTERY DISEASE INVOLVING NATIVE CORONARY ARTERY OF NATIVE HEART WITH ANGINA PECTORIS (HCC): ICD-10-CM

## 2019-11-22 DIAGNOSIS — E78.2 MIXED HYPERLIPIDEMIA: ICD-10-CM

## 2019-11-22 DIAGNOSIS — Z72.0 TOBACCO ABUSE: ICD-10-CM

## 2019-11-22 DIAGNOSIS — I10 ACCELERATED HYPERTENSION: ICD-10-CM

## 2019-11-22 DIAGNOSIS — I48.21 PERMANENT ATRIAL FIBRILLATION (HCC): ICD-10-CM

## 2019-11-22 DIAGNOSIS — I20.9 ANGINA, CLASS III (HCC): Primary | ICD-10-CM

## 2019-11-22 PROBLEM — N18.30 CKD (CHRONIC KIDNEY DISEASE) STAGE 3, GFR 30-59 ML/MIN: Status: ACTIVE | Noted: 2019-11-22

## 2019-11-22 PROCEDURE — 99204 OFFICE O/P NEW MOD 45 MIN: CPT | Performed by: INTERNAL MEDICINE

## 2019-11-22 PROCEDURE — 93000 ELECTROCARDIOGRAM COMPLETE: CPT | Performed by: INTERNAL MEDICINE

## 2019-11-22 RX ORDER — CARVEDILOL 25 MG/1
25 TABLET ORAL 2 TIMES DAILY
Qty: 60 TABLET | Refills: 11 | Status: SHIPPED | OUTPATIENT
Start: 2019-11-22 | End: 2020-12-04

## 2019-11-22 RX ORDER — ISOSORBIDE MONONITRATE 60 MG/1
60 TABLET, EXTENDED RELEASE ORAL EVERY MORNING
Qty: 30 TABLET | Refills: 11 | Status: SHIPPED | OUTPATIENT
Start: 2019-11-22 | End: 2020-11-23

## 2019-11-22 RX ORDER — AMLODIPINE BESYLATE 5 MG/1
5 TABLET ORAL DAILY
COMMUNITY
End: 2020-07-31

## 2020-03-23 ENCOUNTER — TELEPHONE (OUTPATIENT)
Dept: CARDIOLOGY | Facility: CLINIC | Age: 75
End: 2020-03-23

## 2020-03-23 NOTE — TELEPHONE ENCOUNTER
I thought we were rescheduling all appointments anyway especially on patient is doing well, if any other patients called with this request please take care of it.

## 2020-07-06 ENCOUNTER — TELEPHONE (OUTPATIENT)
Dept: CARDIAC SURGERY | Facility: CLINIC | Age: 75
End: 2020-07-06

## 2020-07-06 NOTE — TELEPHONE ENCOUNTER
Pt is due for his f/u and test for chastity for recall appt. pts wife has verified demo.     Please schedule wifes appt on same day

## 2020-07-09 DIAGNOSIS — I71.40 ABDOMINAL AORTIC ANEURYSM WITHOUT RUPTURE (HCC): Primary | ICD-10-CM

## 2020-07-09 DIAGNOSIS — I71.21 ASCENDING AORTIC ANEURYSM (HCC): ICD-10-CM

## 2020-07-31 ENCOUNTER — OFFICE VISIT (OUTPATIENT)
Dept: CARDIOLOGY | Facility: CLINIC | Age: 75
End: 2020-07-31

## 2020-07-31 VITALS
HEIGHT: 72 IN | HEART RATE: 83 BPM | OXYGEN SATURATION: 98 % | DIASTOLIC BLOOD PRESSURE: 84 MMHG | TEMPERATURE: 97.5 F | WEIGHT: 188 LBS | BODY MASS INDEX: 25.47 KG/M2 | SYSTOLIC BLOOD PRESSURE: 152 MMHG

## 2020-07-31 DIAGNOSIS — I71.40 AAA (ABDOMINAL AORTIC ANEURYSM) WITHOUT RUPTURE (HCC): ICD-10-CM

## 2020-07-31 DIAGNOSIS — E78.2 MIXED HYPERLIPIDEMIA: ICD-10-CM

## 2020-07-31 DIAGNOSIS — I25.119 CORONARY ARTERY DISEASE INVOLVING NATIVE CORONARY ARTERY OF NATIVE HEART WITH ANGINA PECTORIS (HCC): Primary | ICD-10-CM

## 2020-07-31 DIAGNOSIS — I48.21 PERMANENT ATRIAL FIBRILLATION (HCC): ICD-10-CM

## 2020-07-31 DIAGNOSIS — I73.9 PVD (PERIPHERAL VASCULAR DISEASE) WITH CLAUDICATION (HCC): ICD-10-CM

## 2020-07-31 PROCEDURE — 99214 OFFICE O/P EST MOD 30 MIN: CPT | Performed by: INTERNAL MEDICINE

## 2020-07-31 RX ORDER — AMLODIPINE BESYLATE 10 MG/1
10 TABLET ORAL DAILY
Qty: 90 TABLET | Refills: 3 | Status: SHIPPED | OUTPATIENT
Start: 2020-07-31 | End: 2021-08-18 | Stop reason: SDUPTHER

## 2020-07-31 NOTE — PROGRESS NOTES
[facility]    Encounter Date: 2020    Patient ID: Segundo Law is a 74 y.o. male.  : 1945     PCP: Nikolay Blevins MD       Chief Complaint: Abdominal Aortic Aneurysm      PROBLEM LIST:  1. Coronary artery disease  a. S/p CABG  2. Peripheral vascular disease  3. Permanent atrial fibrillation  4. Hypertension  5. Hyperlipidemia  6. Ascending aortic aneurysm  7. Tobacco abuse  8. Abdominal aortic aneurysm  a. S/p repeat endograft AAA 16 with reexploration for bleeding. Previous endograft 2012 in [place]  9. Chronic kidney disease  10. Hearing loss  11. GERD  12. Renal cell cancer    History of Present Illness  Patient presents today for a follow-up with a history of CAD with angina, permanent atrial fibrillation, PVD, and cardiac risk factors. Since last visit, he is done well overall.  He has no current complaint of exertional chest pain or dyspnea denies orthopnea, PND, claudication, lower extremity edema.  No awareness of tachyarrhythmias, no dizziness or syncope.  He is complaining of midthoracic back pain which is moderate in intensity and started last night and continues.  It is not exacerbated by flexion or extension of the torso.  It is not exertional in nature.  He reports having had a CT scan in [place] last week.    Allergies   Allergen Reactions   • Nexium [Esomeprazole Magnesium] Other (See Comments)     Rapid Heartrate and A-Fib   • Adhesive Tape Rash   • Latex Rash         Current Outpatient Medications:   •  amLODIPine (NORVASC) 5 MG tablet, Take 5 mg by mouth Daily., Disp: , Rfl:   •  carvedilol (COREG) 25 MG tablet, Take 1 tablet by mouth 2 (Two) Times a Day., Disp: 60 tablet, Rfl: 11  •  CRESTOR 10 MG tablet, Take 10 mg by mouth daily., Disp: , Rfl: 0  •  DIGOX 125 MCG tablet, Take 125 mcg by mouth Daily., Disp: , Rfl: 0  •  fenofibrate (TRICOR) 145 MG tablet, Take 145 mg by mouth daily., Disp: , Rfl: 0  •  isosorbide  "mononitrate (IMDUR) 60 MG 24 hr tablet, Take 1 tablet by mouth Every Morning., Disp: 30 tablet, Rfl: 11  •  PRADAXA 75 MG capsule, Take 75 mg by mouth 2 (two) times a day. Instructed to stop 7/18 per dr alegre office , Disp: , Rfl: 0    The following portions of the patient's history were reviewed and updated as appropriate: allergies, current medications, past family history, past medical history, past social history, past surgical history and problem list.    ROS  Review of Systems   Constitution: Negative for chills, fever, fatigue, generalized weakness.   Cardiovascular: Negative for chest pain, dyspnea on exertion, leg swelling, palpitations, orthopnea, and syncope.   Respiratory: Negative for cough, shortness of breath, and wheezing.  HENT: Negative for ear pain, nosebleeds, and tinnitus.  Gastrointestinal: Negative for abdominal pain, constipation, diarrhea, nausea and vomiting.   Genitourinary: No urinary symptoms.  Musculoskeletal: Negative for muscle cramps.  Neurological: Negative for dizziness, headaches, loss of balance, numbness, and symptoms of stroke.  Psychiatric: Normal mental status.     All other systems reviewed and are negative.        Objective:     /84 (BP Location: Right arm, Patient Position: Sitting)   Pulse 83   Temp 97.5 °F (36.4 °C)   Ht 182.9 cm (72\")   Wt 85.3 kg (188 lb)   SpO2 98%   BMI 25.50 kg/m²      Physical Exam  Constitutional: Patient appears well-developed and well-nourished.   HENT: HEENT exam unremarkable.   Neck: Neck supple. No JVD present. No carotid bruits.   Cardiovascular: Normal rate, irregular rhythm. murmur heard.   2+ symmetric pulses.   Pulmonary/Chest: Breath sounds normal. Does not exhibit tenderness.   Abdominal: Abdomen benign.   Musculoskeletal: Does not exhibit edema.   Neurological: Neurological exam unremarkable.   Vitals reviewed.    Data Review:   Lab date: 11/15/2019  • CMP: Glu 88, BUN 35, Creat 2.4, eGFR 28, Na 138, K 4.6, Cl 105, CO2 " 23, Ca 9.3, Alk Phos 57, AST 30, ALT 17     Procedures       Assessment:      Diagnosis Plan   1. Coronary artery disease involving native coronary artery of native heart with angina pectoris (CMS/HCC)   stable without current anginal symptoms, continue current medical regimen   2. Permanent atrial fibrillation (CMS/HCC)   rate controlled and chronically anticoagulated   3. PVD (peripheral vascular disease) with claudication (CMS/HCC)   no current claudication symptoms, continue current medical regimen   4. Mixed hyperlipidemia   on statin therapy and followed by primary care     Plan:   Upper back pain is likely muscle skeletal in origin.  We called for his CT scan in [place] which shows a 4.9 cm stable aneurysm.  Please advised to take OTC Tylenol or NSAIDs and follow-up with Dr. Villegas for further evaluation, we will try to schedule an earlier appointment.  Overall cardiac status is stable, no angina or CHF type symptoms.  Continue current medications.   FU in 6 MO, sooner as needed.  Thank you for allowing us to participate in the care of your patient.     Scribed for Juaquin Ferguson MD by Electronically signed by CINDI Smith, 07/31/20, 3:23 PM.    I, Juaquin Ferguson MD, personally performed the services described in this documentation as scribed by the above named individual in my presence, and it is both accurate and complete.  7/31/2020  16:38              Please note that portions of this note may have been completed with a voice recognition program. Efforts were made to edit the dictations, but occasionally words are mistranscribed.

## 2020-11-23 RX ORDER — ISOSORBIDE MONONITRATE 60 MG/1
60 TABLET, EXTENDED RELEASE ORAL EVERY MORNING
Qty: 90 TABLET | Refills: 3 | Status: SHIPPED | OUTPATIENT
Start: 2020-11-23 | End: 2022-05-12

## 2020-11-24 ENCOUNTER — OFFICE VISIT (OUTPATIENT)
Dept: CARDIAC SURGERY | Facility: CLINIC | Age: 75
End: 2020-11-24

## 2020-11-24 VITALS
SYSTOLIC BLOOD PRESSURE: 174 MMHG | HEART RATE: 75 BPM | WEIGHT: 189.6 LBS | HEIGHT: 72 IN | DIASTOLIC BLOOD PRESSURE: 85 MMHG | TEMPERATURE: 97.1 F | OXYGEN SATURATION: 99 % | BODY MASS INDEX: 25.68 KG/M2

## 2020-11-24 DIAGNOSIS — I71.21 ASCENDING AORTIC ANEURYSM (HCC): ICD-10-CM

## 2020-11-24 DIAGNOSIS — I71.40 ABDOMINAL AORTIC ANEURYSM WITHOUT RUPTURE (HCC): Primary | ICD-10-CM

## 2020-11-24 PROCEDURE — 99213 OFFICE O/P EST LOW 20 MIN: CPT | Performed by: NURSE PRACTITIONER

## 2020-11-24 NOTE — PROGRESS NOTES
[facility] Follow Up Note     Date of Encounter: 11/24/2020     MRN Number: [id number]  Name: Segundo Law  Phone Number: [phone]     Referred By: No ref. provider found  PCP: Nikolay Blevins MD    Chief Complaint:    Chief Complaint   Patient presents with   • Aortic Aneurysm     1 year follow up with CT chest/abd/pelvis for ascending aortic aneurysm. Patient states that he has been having problems with elevated BP and lower back pain       History of Present Illness:    Segundo Law is a 74 y.o. male with a history of hypertension, hyperlipidemia, atrial fibrillation, chronic kidney disease, renal cancer status post left nephrectomy, coronary artery disease status post stents/CABG, ongoing tobacco use, thoracic aortic aneurysm, AAA status post remote endovascular repair in 2012 [place] and redo endovascular AAA repair on 7/20/2016 with Dr. Villegas.  Patient is here today for annual follow-up of his AAA and thoracic aortic aneurysm with CT chest/abdomen/pelvis.  He reports that he has been doing well.  He follows closely with Dr. Ferguson.  Denies any chest pain.  Patient with baseline right upper quadrant abdominal pain and left flank back pain.  His blood pressures at home have been elevated in the 130s to 200s over 80s to 90s.  It is elevated in the office today 174/85.  He does report that he has been taking his Coreg therapy only once a day.    Review of Systems:  Review of Systems   Constitution: Negative for chills, decreased appetite, diaphoresis, fever, malaise/fatigue, night sweats and weight loss.   HENT: Negative for congestion, hoarse voice, sore throat and stridor.    Cardiovascular: Negative for chest pain, claudication, dyspnea on exertion, irregular heartbeat, leg swelling, near-syncope, orthopnea, palpitations, paroxysmal nocturnal dyspnea and syncope.   Respiratory: Negative for cough, hemoptysis, shortness of breath, sleep disturbances due to  breathing, snoring, sputum production and wheezing.    Hematologic/Lymphatic: Negative for adenopathy and bleeding problem. Does not bruise/bleed easily.   Skin: Negative for color change, dry skin, itching, poor wound healing and rash.   Musculoskeletal: Positive for back pain (baseline left flank). Negative for arthritis, falls and muscle weakness.   Gastrointestinal: Positive for abdominal pain. Negative for anorexia, constipation, diarrhea, hematochezia, melena, nausea and vomiting.   Neurological: Negative for difficulty with concentration, disturbances in coordination, dizziness, loss of balance, numbness, seizures, vertigo and weakness.   Psychiatric/Behavioral: Negative for altered mental status, depression, memory loss and substance abuse. The patient does not have insomnia and is not nervous/anxious.    Allergic/Immunologic: Negative for persistent infections.       I have reviewed the review of systems as entered by my clinical support staff and have updated it as appropriate.       Allergies:  Allergies   Allergen Reactions   • Nexium [Esomeprazole Magnesium] Other (See Comments)     Rapid Heartrate and A-Fib   • Adhesive Tape Rash   • Latex Rash       Medications:      Current Outpatient Medications:   •  amLODIPine (NORVASC) 10 MG tablet, Take 1 tablet by mouth Daily., Disp: 90 tablet, Rfl: 3  •  carvedilol (COREG) 25 MG tablet, Take 1 tablet by mouth 2 (Two) Times a Day., Disp: 60 tablet, Rfl: 11  •  CRESTOR 10 MG tablet, Take 10 mg by mouth daily., Disp: , Rfl: 0  •  DIGOX 125 MCG tablet, Take 125 mcg by mouth Daily., Disp: , Rfl: 0  •  fenofibrate (TRICOR) 145 MG tablet, Take 145 mg by mouth daily., Disp: , Rfl: 0  •  isosorbide mononitrate (IMDUR) 60 MG 24 hr tablet, TAKE 1 TABLET BY MOUTH EVERY MORNING, Disp: 90 tablet, Rfl: 3  •  PRADAXA 75 MG capsule, Take 75 mg by mouth 2 (two) times a day. Instructed to stop 7/18 per dr alegre office , Disp: , Rfl: 0    Social History     Socioeconomic History    • Marital status:      Spouse name: Not on file   • Number of children: 2   • Years of education: Not on file   • Highest education level: Not on file   Occupational History   • Occupation: Retired    Tobacco Use   • Smoking status: Current Every Day Smoker     Packs/day: 1.00     Years: 55.00     Pack years: 55.00     Types: Cigarettes   • Smokeless tobacco: Never Used   Substance and Sexual Activity   • Alcohol use: No   • Drug use: No   • Sexual activity: Defer   Social History Narrative    Lives in [place]       Family History   Problem Relation Age of Onset   • Heart failure Mother    • Other Father         was murdered in 1971   • Breast cancer Sister    • No Known Problems Brother    • No Known Problems Sister    • No Known Problems Sister    • No Known Problems Brother    • No Known Problems Brother    • No Known Problems Brother    • No Known Problems Brother        Past Medical History:   Diagnosis Date   • Cancer of kidney (CMS/HCC)    • Coronary artery disease    • Enlarged prostate    • Essential hypertension 11/21/2019   • Full dentures    • Hearing loss of both ears     from work    • Hyperlipidemia    • Pneumonia     h/o   • Wears glasses        Past Surgical History:   Procedure Laterality Date   • ABDOMINAL AORTIC ANEURYSM REPAIR WITH ENDOGRAFT      Endologic graft, 7/17/12 in [place], Dr. Ej [place]   • AORTAGRAM N/A 7/20/2016    Procedure: Aortogram with possible stenting ;  Surgeon: Lefty Villegas MD;  Location:  [facility] OR ;  Service:    • ARTERIAL ANEURYSM REPAIR     • CHOLECYSTECTOMY     • COLONOSCOPY     • CORONARY ARTERY BYPASS GRAFT      x2   • CORONARY STENT PLACEMENT      1 stent    • ENDOSCOPY     • NEPHRECTOMY Left    • LA AAA REPAIR,AORTO-AORTIC TUBE PROSTH N/A 7/20/2016    Procedure: Abdominal Aortic Aneurysm Repair With Endograft;  Surgeon: Lefty Villegas MD;  Location:  [facility] OR ;  Service: Vascular   • TONSILLECTOMY         Physical  "Exam:  Vital Signs:    Vitals:    11/24/20 1033   BP: 174/85   BP Location: Right arm   Patient Position: Sitting   Pulse: 75   Temp: 97.1 °F (36.2 °C)   SpO2: 99%   Weight: 86 kg (189 lb 9.6 oz)   Height: 182.9 cm (72\")     Body mass index is 25.71 kg/m².     Physical Exam  Vitals signs and nursing note reviewed.   Constitutional:       Appearance: Normal appearance. He is well-developed and well-groomed.   HENT:      Head: Normocephalic and atraumatic.   Neck:      Musculoskeletal: Neck supple.   Cardiovascular:      Rate and Rhythm: Normal rate. Rhythm irregular.      Heart sounds: Normal heart sounds, S1 normal and S2 normal.   Pulmonary:      Comments: Unlabored, Clear to auscultation bilaterally  Abdominal:      General: Bowel sounds are normal.      Palpations: Abdomen is soft.      Tenderness: There is no abdominal tenderness.   Musculoskeletal:      Right lower leg: No edema.      Left lower leg: No edema.   Skin:     General: Skin is warm and dry.   Neurological:      Mental Status: He is alert and oriented to person, place, and time.   Psychiatric:         Attention and Perception: Attention normal.         Mood and Affect: Mood normal.         Speech: Speech normal.         Behavior: Behavior is cooperative.         Labs/Imaging:    CT chest without contrast 7/20/2020 [facility] with TAA 4.9 cm.    CT abdomen/pelvis without contrast 8/14/2020 [place]: Stable AAA status post endograft at 5.9 cm with no endoleak.  Limited study due to lack of IV contrast.    Assessment / Plan:  Diagnoses and all orders for this visit:    1. Abdominal aortic aneurysm without rupture status post repeat endograft 7/20/2016 (Primary)    2. Ascending aortic aneurysm (CMS/HCC)     Segundo Law is a 74 y.o. male with a history of hypertension, hyperlipidemia, atrial fibrillation, chronic kidney disease, renal cancer status post left nephrectomy, coronary artery disease status post stents/CABG, ongoing tobacco use, " thoracic aortic aneurysm, AAA status post remote endovascular repair in 2012 [place] and redo endovascular AAA repair on 7/20/2016 with Dr. Villegas.  Discussed findings of CT chest/abdomen/pelvis with TAA 4.9 cm in 2019 was 4.8 cm; AAA with endograft stable at 5.9 cm with no endoleak, 2019 AAA measured at 6.6 cm.  Patient continues to smoke and have counseled him on cessation.  Patient with uncontrolled blood pressure.  Advised patient to take Coreg as directed which is twice a day.  Discussed blood pressure monitoring at home and will make appointment with [facility]/Dr. Ferguson for further medication management.  Will obtain aortic ultrasound and CT of the chest without contrast in 1 year    SHANTANU Alonso  [facility]    Please note that portions of this note may have been completed with a voice recognition program. Efforts were made to edit the dictations, but occasionally words are mistranscribed.

## 2020-12-04 RX ORDER — CARVEDILOL 25 MG/1
TABLET ORAL
Qty: 180 TABLET | Refills: 3 | Status: SHIPPED | OUTPATIENT
Start: 2020-12-04 | End: 2022-03-09 | Stop reason: SDUPTHER

## 2021-08-18 RX ORDER — AMLODIPINE BESYLATE 10 MG/1
10 TABLET ORAL DAILY
Qty: 90 TABLET | Refills: 0 | Status: SHIPPED | OUTPATIENT
Start: 2021-08-18

## 2021-10-12 DIAGNOSIS — I71.40 ABDOMINAL AORTIC ANEURYSM WITHOUT RUPTURE (HCC): Primary | ICD-10-CM

## 2021-10-12 DIAGNOSIS — I71.21 ASCENDING AORTIC ANEURYSM (HCC): ICD-10-CM

## 2021-11-12 DIAGNOSIS — Z00.6 EXAMINATION FOR NORMAL COMPARISON FOR CLINICAL RESEARCH: Primary | ICD-10-CM

## 2021-11-16 ENCOUNTER — OFFICE VISIT (OUTPATIENT)
Dept: CARDIAC SURGERY | Facility: CLINIC | Age: 76
End: 2021-11-16

## 2021-11-16 VITALS
HEART RATE: 72 BPM | WEIGHT: 175 LBS | BODY MASS INDEX: 23.7 KG/M2 | HEIGHT: 72 IN | OXYGEN SATURATION: 99 % | TEMPERATURE: 97.1 F | SYSTOLIC BLOOD PRESSURE: 132 MMHG | DIASTOLIC BLOOD PRESSURE: 68 MMHG

## 2021-11-16 DIAGNOSIS — I71.40 ABDOMINAL AORTIC ANEURYSM WITHOUT RUPTURE (HCC): Primary | ICD-10-CM

## 2021-11-16 DIAGNOSIS — I71.21 ANEURYSM OF ASCENDING AORTA (HCC): ICD-10-CM

## 2021-11-16 DIAGNOSIS — R91.1 LEFT UPPER LOBE PULMONARY NODULE: ICD-10-CM

## 2021-11-16 DIAGNOSIS — R11.2 NAUSEA AND VOMITING, INTRACTABILITY OF VOMITING NOT SPECIFIED, UNSPECIFIED VOMITING TYPE: ICD-10-CM

## 2021-11-16 DIAGNOSIS — R10.9 ABDOMINAL PAIN, UNSPECIFIED ABDOMINAL LOCATION: ICD-10-CM

## 2021-11-16 PROCEDURE — 99215 OFFICE O/P EST HI 40 MIN: CPT | Performed by: NURSE PRACTITIONER

## 2021-11-16 NOTE — PROGRESS NOTES
[facility] Cardiothoracic Surgery Office Follow Up Note     Date of Encounter: 11/16/2021     YOB: 1945  Name: Segundo Law    PCP: Nikolay Blevins MD    Chief Complaint:    Chief Complaint   Patient presents with   • Follow-up     1 yr f/u with AAA US and CT Chest. Pt stated he is having nausea and pain in abdomen & back for the past 6 months but has became worse.        History of Present Illness:       Segundo Law is a 75 y.o. male with a history of hypertension, hyperlipidemia, atrial fibrillation, chronic kidney disease, renal cancer status post left nephrectomy, coronary artery disease status post stents/CABG, ongoing tobacco use, thoracic aortic aneurysm (4.4 cm), AAA status post remote endovascular repair in 2012 [place] and redo endovascular AAA repair on 7/20/2016 with Dr. Villegas (2019 measured 6.6 cm, 2020 5.9 cm).    Patient presents today for annual follow-up with aortic ultrasound and CT chest.  Last seen in the office on 11/24/2020.  Since last office visit, sadly patient's wife passed away on Friday following a long shaw with cancer.  He is continuing to process recent events.  In regards to his health, he denies any unusual cough, hemoptysis or unexplained weight loss.  Patient does report a new abdominal and back pain over the last 6 months with nausea and vomiting over the last month.  Patient has been under a great deal of stress recently with the caring of his ill wife.  Blood pressures have remained stable.  Pt is a smoker with a history of renal cancer.    Review of Systems:  Review of Systems   Constitutional: Positive for decreased appetite and malaise/fatigue. Negative for chills, diaphoresis, fever, night sweats and weight loss.   HENT: Negative for congestion, hoarse voice, sore throat and stridor.    Cardiovascular: Positive for dyspnea on exertion and leg swelling. Negative for chest pain, claudication, irregular heartbeat, near-syncope,  orthopnea, palpitations, paroxysmal nocturnal dyspnea and syncope.   Respiratory: Negative for cough, hemoptysis, shortness of breath, sleep disturbances due to breathing, snoring, sputum production and wheezing.    Hematologic/Lymphatic: Negative for adenopathy and bleeding problem. Does not bruise/bleed easily.   Skin: Negative for color change, dry skin, itching, poor wound healing and rash.   Musculoskeletal: Positive for back pain. Negative for arthritis, falls and muscle weakness.   Gastrointestinal: Positive for abdominal pain, nausea and vomiting. Negative for anorexia, constipation, diarrhea, hematochezia and melena.   Neurological: Positive for loss of balance. Negative for difficulty with concentration, disturbances in coordination, dizziness, numbness, seizures, vertigo and weakness.   Psychiatric/Behavioral: Negative for altered mental status, depression, memory loss and substance abuse. The patient does not have insomnia and is not nervous/anxious.    Allergic/Immunologic: Negative for persistent infections.       Allergies:  Allergies   Allergen Reactions   • Nexium [Esomeprazole Magnesium] Other (See Comments)     Rapid Heartrate and A-Fib   • Adhesive Tape Rash   • Latex Rash       Medications:      Current Outpatient Medications:   •  carvedilol (COREG) 25 MG tablet, TAKE 1 TABLET BY MOUTH TWICE A DAY, Disp: 180 tablet, Rfl: 3  •  CRESTOR 10 MG tablet, Take 10 mg by mouth daily., Disp: , Rfl: 0  •  DIGOX 125 MCG tablet, Take 125 mcg by mouth Daily., Disp: , Rfl: 0  •  fenofibrate (TRICOR) 145 MG tablet, Take 145 mg by mouth daily., Disp: , Rfl: 0  •  isosorbide mononitrate (IMDUR) 60 MG 24 hr tablet, TAKE 1 TABLET BY MOUTH EVERY MORNING, Disp: 90 tablet, Rfl: 3  •  PRADAXA 75 MG capsule, Take 75 mg by mouth 2 (two) times a day. Instructed to stop 7/18 per dr alegre office , Disp: , Rfl: 0  •  amLODIPine (NORVASC) 10 MG tablet, Take 1 tablet by mouth Daily., Disp: 90 tablet, Rfl: 0    Social History      Socioeconomic History   • Marital status:    • Number of children: 2   Tobacco Use   • Smoking status: Current Every Day Smoker     Packs/day: 1.00     Years: 55.00     Pack years: 55.00     Types: Cigarettes   • Smokeless tobacco: Never Used   Substance and Sexual Activity   • Alcohol use: No   • Drug use: No   • Sexual activity: Defer       Family History   Problem Relation Age of Onset   • Heart failure Mother    • Other Father         was murdered in 1971   • Breast cancer Sister    • No Known Problems Brother    • No Known Problems Sister    • No Known Problems Sister    • No Known Problems Brother    • No Known Problems Brother    • No Known Problems Brother    • No Known Problems Brother        Past Medical History:   Diagnosis Date   • Cancer of kidney (HCC)    • Coronary artery disease    • Enlarged prostate    • Essential hypertension 11/21/2019   • Full dentures    • Hearing loss of both ears     from work    • Hyperlipidemia    • Pneumonia     h/o   • Wears glasses        Past Surgical History:   Procedure Laterality Date   • ABDOMINAL AORTIC ANEURYSM REPAIR WITH ENDOGRAFT      Endologic graft, 7/17/12 in [place], Dr. Ej Spring   • AORTAGRAM N/A 7/20/2016    Procedure: Aortogram with possible stenting ;  Surgeon: Lefty Villegas MD;  Location: [facility] OR ;  Service:    • ARTERIAL ANEURYSM REPAIR     • CHOLECYSTECTOMY     • COLONOSCOPY     • CORONARY ARTERY BYPASS GRAFT      x2   • CORONARY STENT PLACEMENT      1 stent    • ENDOSCOPY     • NEPHRECTOMY Left    • [place] AAA REPAIR,AORTO-AORTIC TUBE PROSTH N/A 7/20/2016    Procedure: Abdominal Aortic Aneurysm Repair With Endograft;  Surgeon: Lefty Villegas MD;  Location: [facility] OR ;  Service: Vascular   • TONSILLECTOMY         I have reviewed the following portions of the patient's history: allergies, current medications, past family history, past medical history, past social history, past surgical history and problem list and  "confirm it's accurate.    Physical Exam:  Vital Signs:    Vitals:    21 1021   BP: 132/68   BP Location: Right arm   Pulse: 72   Temp: 97.1 °F (36.2 °C)   SpO2: 99%   Weight: 79.4 kg (175 lb)   Height: 182.9 cm (72\")     Body mass index is 23.73 kg/m².     Physical Exam  Vitals and nursing note reviewed.   Constitutional:       Appearance: Normal appearance. He is well-developed and well-groomed.   HENT:      Head: Normocephalic and atraumatic.   Cardiovascular:      Rate and Rhythm: Normal rate and regular rhythm.      Heart sounds: Normal heart sounds, S1 normal and S2 normal. No murmur heard.  No friction rub.   Pulmonary:      Comments: Unlabored, Coarse to auscultation bilaterally  Abdominal:      General: Bowel sounds are normal.      Palpations: Abdomen is soft.      Tenderness: There is no abdominal tenderness.   Musculoskeletal:      Cervical back: Neck supple.      Right lower leg: No edema.      Left lower leg: No edema.   Skin:     General: Skin is warm and dry.   Neurological:      Mental Status: He is alert and oriented to person, place, and time.   Psychiatric:         Attention and Perception: Attention normal.         Mood and Affect: Mood normal.         Speech: Speech normal.         Behavior: Behavior is cooperative.         Labs/Imagin20 CT chest without contrast 20:  4.9 cm ascending aortic aneurysm in greatest axial dimension.  Not fully evaluated without IV contrast.  Calcified granulomatous disease is noted.    21 aortic US:  Infrarenal 6.1 cm aneurysm    21 CT chest without contrast:  4.4 cm ascending aorta.  Moderate emphysematous changes.  Abdominal aorta is incompletely visualized.  New 8 mm pulmonary nodule in the left upper lobe with slight spiculation noted.  Recommendation for PET/CT.    Personally reviewed.    Time Spent: I spent 42 minutes caring for Segundo on this date of service. This time includes time spent by me in the following activities: " preparing for the visit, reviewing tests, obtaining and/or reviewing a separately obtained history, performing a medically appropriate examination and/or evaluation, counseling and educating the patient/family/caregiver, ordering medications, tests, or procedures, documenting information in the medical record, independently interpreting results and communicating that information with the patient/family/caregiver and care coordination.     Assessment / Plan:  Diagnoses and all orders for this visit:    1. Abdominal aortic aneurysm without rupture status post repeat endograft 7/20/2016 (Primary)    2. Aneurysm of ascending aorta (HCC)    3. Left upper lobe pulmonary nodule       Segundo Law is a 75 y.o. male with a history of hypertension, hyperlipidemia, atrial fibrillation, chronic kidney disease, renal cancer status post left nephrectomy, coronary artery disease status post stents/CABG, ongoing tobacco use, thoracic aortic aneurysm (4.4 cm), AAA status post remote endovascular repair in 2012 [place] and redo endovascular AAA repair on 7/20/2016 with Dr. Villegas (2019 measured 6.6 cm, 2020 5.9 cm).    Discussed findings of aortic US, with small increase from 5.9 cm on CT to 6.1 cm on US with new abdominal pain/back pain over the last 6 months with new n/v the last month.  Will obtain CT abd/pelvis with telephone visit.  Have asked pt to make appt with his PCP for full evaluation.  Discussed CT chest with stable 4.4 cm ascending aorta (CT without contrast) and appears to be new 8 mm slightly spiculated pulmonary nodule in the left upper lobe.  Recommendation for PET/CT scan.  On previous CT chest in 2020, nodule was not mentioned but we do not have imaging to review.  Will obtain from [facility]ter[facility].  With new pulmonary nodule being subcentimeter, PET/CT scan at this time would not be indicated.  Concerns with history of renal cancer with no recent evaluation.  We will obtain CT chest in 3 months with follow  up.    Malinda Cruz APRP[facility] Center Cardiothoracic Surgery

## 2021-11-29 DIAGNOSIS — Z00.6 EXAMINATION FOR NORMAL COMPARISON FOR CLINICAL RESEARCH: Primary | ICD-10-CM

## 2021-12-20 RX ORDER — CARVEDILOL 25 MG/1
TABLET ORAL
Qty: 180 TABLET | Refills: 3 | OUTPATIENT
Start: 2021-12-20

## 2021-12-27 DIAGNOSIS — I71.21 ASCENDING AORTIC ANEURYSM (HCC): Primary | ICD-10-CM

## 2022-02-18 DIAGNOSIS — Z00.6 EXAMINATION FOR NORMAL COMPARISON FOR CLINICAL RESEARCH: Primary | ICD-10-CM

## 2022-02-22 ENCOUNTER — OFFICE VISIT (OUTPATIENT)
Dept: CARDIAC SURGERY | Facility: CLINIC | Age: 77
End: 2022-02-22

## 2022-02-22 VITALS
OXYGEN SATURATION: 98 % | WEIGHT: 164.6 LBS | HEART RATE: 68 BPM | BODY MASS INDEX: 22.29 KG/M2 | HEIGHT: 72 IN | SYSTOLIC BLOOD PRESSURE: 157 MMHG | DIASTOLIC BLOOD PRESSURE: 76 MMHG | TEMPERATURE: 98.2 F

## 2022-02-22 DIAGNOSIS — I71.21 ANEURYSM OF ASCENDING AORTA: ICD-10-CM

## 2022-02-22 DIAGNOSIS — I71.40 ABDOMINAL AORTIC ANEURYSM WITHOUT RUPTURE: ICD-10-CM

## 2022-02-22 DIAGNOSIS — R91.1 LUNG NODULE: Primary | ICD-10-CM

## 2022-02-22 PROCEDURE — 99214 OFFICE O/P EST MOD 30 MIN: CPT | Performed by: NURSE PRACTITIONER

## 2022-02-22 RX ORDER — TAMSULOSIN HYDROCHLORIDE 0.4 MG/1
1 CAPSULE ORAL DAILY
COMMUNITY
Start: 2022-01-12

## 2022-02-22 RX ORDER — NITROGLYCERIN 0.4 MG/1
TABLET SUBLINGUAL
COMMUNITY
Start: 2021-11-29

## 2022-02-22 RX ORDER — TRAZODONE HYDROCHLORIDE 150 MG/1
150 TABLET ORAL
COMMUNITY
Start: 2022-01-11

## 2022-02-22 RX ORDER — PANTOPRAZOLE SODIUM 40 MG/1
40 TABLET, DELAYED RELEASE ORAL DAILY
COMMUNITY
Start: 2021-12-17

## 2022-02-22 NOTE — PROGRESS NOTES
[facility] Cardiothoracic Surgery Office Follow Up Note     Date of Encounter: 02/22/2022     YOB: 1945  Name: Segundo Law    PCP: Nikolay Blevins MD    Chief Complaint:    Chief Complaint   Patient presents with   • Aortic Aneurysm     3 month follow-up with with results from CT chest to evaluate thoracic aortic aneurysm. History of EVAR. Patient recently diagnosis with pleurisy- having pain in mid-left chest and in back. Has also lost about 10 pounds in the last 3 months.       History of Present Illness:      Segundo Law is a 76 y.o. male with a history of hypertension, hyperlipidemia, atrial fibrillation, chronic kidney disease, renal cancer status post left nephrectomy, coronary artery disease status post stents/CABG, ongoing tobacco use, thoracic aortic aneurysm (4.4 cm), AAA status post remote endovascular repair in 2012 [place] and redo endovascular AAA repair on 7/20/2016 with Dr. Villegas (2019 measured 6.6 cm, 2020 5.9 cm) and 8 mm left upper lobe nodule.   Pt presents for follow up of CT abd/pelvis and CT chest.  Last seen in the office 11/16/21 and at that time native AAA with interval enlargement from 5.9 cm to 6.1 cm with abd/back pain and CT abd/pelvis without contrast (due to renal function/nephrectomy) was ordered.  Also noted new 8 mm slightly spiculated pulmonary nodule in the left upper lobe and plans for CT chest in 3 months.  With it being subcentimeter, PET/CT was not indicated.  Since last office visit, patient has lost approximately 20 pounds in the last 3 months and he attributes it to poor appetite related to recent death of his wife with associated anxiety.  He denies any cough or hemoptysis.  He does report a recent diagnosis of pleurisy with pain in his mid left chest into his back and worsens when he bends over.  He denies any fevers or chills.  He does have a history of CAD s/p stents/CABG in the chest pain is not similar to his previous anginal  symptoms.  He denies any unusual abdominal, back or flank pain.  Blood pressures have been stable.  He is continuing to smoke.      Review of Systems:  Review of Systems   Constitutional: Positive for decreased appetite and weight loss. Negative for chills, diaphoresis, fever, malaise/fatigue and night sweats.   HENT: Negative for congestion, hoarse voice, sore throat and stridor.    Cardiovascular: Positive for chest pain and dyspnea on exertion. Negative for claudication, irregular heartbeat, leg swelling, near-syncope, orthopnea, palpitations, paroxysmal nocturnal dyspnea and syncope.   Respiratory: Negative for cough, hemoptysis, shortness of breath, sleep disturbances due to breathing, snoring, sputum production and wheezing.    Hematologic/Lymphatic: Negative for adenopathy and bleeding problem. Bruises/bleeds easily.   Skin: Negative for color change, dry skin, itching, poor wound healing and rash.   Musculoskeletal: Positive for back pain. Negative for arthritis, falls and muscle weakness.   Gastrointestinal: Negative for abdominal pain, anorexia, constipation, diarrhea, hematochezia, melena, nausea and vomiting.   Neurological: Positive for loss of balance. Negative for difficulty with concentration, disturbances in coordination, dizziness, numbness, seizures, vertigo and weakness.   Psychiatric/Behavioral: Negative for altered mental status, depression, memory loss and substance abuse. The patient is nervous/anxious. The patient does not have insomnia.    Allergic/Immunologic: Negative for persistent infections.       Allergies:  Allergies   Allergen Reactions   • Nexium [Esomeprazole Magnesium] Other (See Comments)     Rapid Heartrate and A-Fib   • Adhesive Tape Rash   • Latex Rash       Medications:      Current Outpatient Medications:   •  amLODIPine (NORVASC) 10 MG tablet, Take 1 tablet by mouth Daily., Disp: 90 tablet, Rfl: 0  •  carvedilol (COREG) 25 MG tablet, TAKE 1 TABLET BY MOUTH TWICE A DAY,  Disp: 180 tablet, Rfl: 3  •  CRESTOR 10 MG tablet, Take 10 mg by mouth daily., Disp: , Rfl: 0  •  DIGOX 125 MCG tablet, Take 125 mcg by mouth Daily., Disp: , Rfl: 0  •  fenofibrate (TRICOR) 145 MG tablet, Take 145 mg by mouth daily., Disp: , Rfl: 0  •  isosorbide mononitrate (IMDUR) 60 MG 24 hr tablet, TAKE 1 TABLET BY MOUTH EVERY MORNING, Disp: 90 tablet, Rfl: 3  •  nitroglycerin (NITROSTAT) 0.4 MG SL tablet, ONE TABLET UNDER TONGUE AS NEEDED FOR CHEST PAIN, Disp: , Rfl:   •  pantoprazole (PROTONIX) 40 MG EC tablet, Take 40 mg by mouth Daily., Disp: , Rfl:   •  PRADAXA 75 MG capsule, Take 75 mg by mouth 2 (two) times a day. Instructed to stop 7/18 per dr alegre office , Disp: , Rfl: 0  •  tamsulosin (FLOMAX) 0.4 MG capsule 24 hr capsule, Take 1 capsule by mouth Daily., Disp: , Rfl:   •  traZODone (DESYREL) 150 MG tablet, Take 150 mg by mouth every night at bedtime., Disp: , Rfl:     Social History     Socioeconomic History   • Marital status:    • Number of children: 2   Tobacco Use   • Smoking status: Current Every Day Smoker     Packs/day: 1.00     Years: 55.00     Pack years: 55.00     Types: Cigarettes   • Smokeless tobacco: Never Used   Vaping Use   • Vaping Use: Never used   Substance and Sexual Activity   • Alcohol use: No   • Drug use: No   • Sexual activity: Defer       Family History   Problem Relation Age of Onset   • Heart failure Mother    • Other Father         was murdered in 1971   • Breast cancer Sister    • No Known Problems Brother    • No Known Problems Sister    • No Known Problems Sister    • No Known Problems Brother    • No Known Problems Brother    • No Known Problems Brother    • No Known Problems Brother        Past Medical History:   Diagnosis Date   • Cancer of kidney (HCC)    • Coronary artery disease    • Enlarged prostate    • Essential hypertension 11/21/2019   • Full dentures    • Hearing loss of both ears     from work    • Hyperlipidemia    • Pleurisy    • Pneumonia      "h/o   • Wears glasses        Past Surgical History:   Procedure Laterality Date   • ABDOMINAL AORTIC ANEURYSM REPAIR WITH ENDOGRAFT      Endologic graft, 7/17/12 in [place], Dr. Ej Spring   • AORTAGRAM N/A 7/20/2016    Procedure: Aortogram with possible stenting ;  Surgeon: Lefty Villegas MD;  Location: [facility] HYBRID OR 15;  Service:    • ARTERIAL ANEURYSM REPAIR     • CHOLECYSTECTOMY     • COLONOSCOPY     • CORONARY ARTERY BYPASS GRAFT      x2   • CORONARY STENT PLACEMENT      1 stent    • ENDOSCOPY     • NEPHRECTOMY Left    • [place] AAA REPAIR,AORTO-AORTIC TUBE PROSTH N/A 7/20/2016    Procedure: Abdominal Aortic Aneurysm Repair With Endograft;  Surgeon: Lefty Villegas MD;  Location:  [facility] HYBRID OR 15;  Service: Vascular   • TONSILLECTOMY         I have reviewed the following portions of the patient's history: allergies, current medications, past family history, past medical history, past social history, past surgical history and problem list and confirm it's accurate.    Physical Exam:  Vital Signs:    Vitals:    02/22/22 1140   BP: 157/76   BP Location: Right arm   Patient Position: Sitting   Pulse: 68   Temp: 98.2 °F (36.8 °C)   SpO2: 98%   Weight: 74.7 kg (164 lb 9.6 oz)   Height: 182.9 cm (72\")     Body mass index is 22.32 kg/m².     Physical Exam  Vitals and nursing note reviewed.   Constitutional:       Appearance: He is well-groomed.      Comments: Thin   HENT:      Head: Normocephalic and atraumatic.   Cardiovascular:      Rate and Rhythm: Normal rate and regular rhythm.      Heart sounds: Normal heart sounds, S1 normal and S2 normal. No murmur heard.      Pulmonary:      Comments: Unlabored, Coarse to auscultation bilaterally  Musculoskeletal:      Cervical back: Neck supple.   Lymphadenopathy:      Cervical: No cervical adenopathy.   Skin:     General: Skin is warm and dry.   Neurological:      Mental Status: He is alert and oriented to person, place, and time.   Psychiatric:         Attention and " "Perception: Attention normal.         Mood and Affect: Mood normal.         Speech: Speech normal.         Behavior: Behavior is cooperative.         Labs/Imaging:    CT chest without contrast: 2/10/2022 [facility]: Ascending thoracic aortic aneurysm 5.0 cm (on personal review 4.5 cm).  Unchanged 8 mm left upper lobe nodule.  Mild patchy groundglass, bilateral predominantly in the lung base which could be related to atypical infection.  Showed \"shotty mediastinal lymph nodes\"    Personally reviewed      Time Spent: I spent 32 minutes caring for Segundo on this date of service. This time includes time spent by me in the following activities: preparing for the visit, reviewing tests, obtaining and/or reviewing a separately obtained history, performing a medically appropriate examination and/or evaluation, counseling and educating the patient/family/caregiver, ordering medications, tests, or procedures, documenting information in the medical record, independently interpreting results and communicating that information with the patient/family/caregiver and care coordination.     Assessment / Plan:  Diagnoses and all orders for this visit:    1. Lung nodule (Primary)    2. Aneurysm of ascending aorta (HCC)    3. Abdominal aortic aneurysm without rupture status post repeat endograft 7/20/2016     Segundo Law is a 76 y.o. male with a history of hypertension, hyperlipidemia, atrial fibrillation, chronic kidney disease, renal cancer status post left nephrectomy, coronary artery disease status post stents/CABG, ongoing tobacco use, thoracic aortic aneurysm (4.4 cm), AAA status post remote endovascular repair in 2012 [place] and redo endovascular AAA repair on 7/20/2016 with Dr. Villegas (2019 measured 6.6 cm, 2020 5.9 cm) and 8 mm left upper lobe nodule.     Discussed findings of CT chest with stable 4.5 cm (on personal review) ascending aortic aneurysm and unchanged stable 8 mm left upper lobe nodule.  Report did mention " "\"shotty mediastinal lymph nodes\".   Concerns with patient's recent 20 pound weight loss which may be related to poor appetite/recent death of his wife.  However, patient is a longtime smoker and is now reporting a new chest and back pain currently treated for pleurisy.  PET/CT scan/biopsy would not be optimal with subcentimeter nodule.  Will discuss with Dr. Villegas and have him to review imaging for further recommendations.  At this time, we will plan to follow-up on lung nodule in 6 months with repeat CT chest since it has not changed in the last 3 months.      Unfortunately, it does not appear that patient had his CT abdomen/pelvis without contrast performed for evaluation of his EVAR with interval enlargement of native AAA from 5.9 cm to 6.1 cm on aortic ultrasound in Nov 2021.  We will have our office to reach out to [facility] Chatterjee to see if patient had performed, if not we will order and follow-up with patient with a telephone visit.     Addendum:  Reviewed pt and imaging with Dr. Andujar.  Stable LLL 8 mm nodule and will plan for repeat CT chest in 6 months.  Our , Christian contacted [facility] Chatterjee and CT abd/ pelvis was last performed in 2020 and pt has not had scan as ordered.  As mentioned above, interval enlargement of native AAA from 5.9 to 6.1 cm on US s/p EVAR.  Christian will reach out to pt to schedule follow up CT abd/pelvis.      Malinda Cruz, APRJIM  [facility] Cardiothoracic Surgery    "

## 2022-03-03 ENCOUNTER — TELEPHONE (OUTPATIENT)
Dept: CARDIAC SURGERY | Facility: CLINIC | Age: 77
End: 2022-03-03

## 2022-03-09 DIAGNOSIS — R10.9 ABDOMINAL PAIN, UNSPECIFIED ABDOMINAL LOCATION: ICD-10-CM

## 2022-03-09 DIAGNOSIS — I71.40 ABDOMINAL AORTIC ANEURYSM WITHOUT RUPTURE: Primary | ICD-10-CM

## 2022-03-09 DIAGNOSIS — R11.2 NAUSEA AND VOMITING: ICD-10-CM

## 2022-03-09 RX ORDER — CARVEDILOL 25 MG/1
25 TABLET ORAL 2 TIMES DAILY
Qty: 180 TABLET | Refills: 0 | Status: SHIPPED | OUTPATIENT
Start: 2022-03-09 | End: 2022-06-03

## 2022-04-13 ENCOUNTER — TELEPHONE (OUTPATIENT)
Dept: CARDIAC SURGERY | Facility: CLINIC | Age: 77
End: 2022-04-13

## 2022-05-12 ENCOUNTER — OFFICE VISIT (OUTPATIENT)
Dept: CARDIAC SURGERY | Facility: CLINIC | Age: 77
End: 2022-05-12

## 2022-05-12 DIAGNOSIS — I71.40 ABDOMINAL AORTIC ANEURYSM WITHOUT RUPTURE: Primary | ICD-10-CM

## 2022-05-12 PROCEDURE — 99442 PR PHYS/QHP TELEPHONE EVALUATION 11-20 MIN: CPT | Performed by: NURSE PRACTITIONER

## 2022-05-12 NOTE — PROGRESS NOTES
[facility] Cardiothoracic Surgery Telephone visit    You have chosen to receive care through a telephone visit. Do you consent to use a telephone visit for your medical care today? Yes     Date of Encounter: 05/12/2022     MRN Number:  [id number]  Name:  Segundo Law  Phone Number: [phone]     Referred By: No ref. provider found  PCP:  Nikolay Blevins MD    Chief Complaint:    Chief Complaint   Patient presents with   • Follow-up     Follow up w/ ct abd/pelvis for AAA- Pt states that he is feeling well, only fatigued w/ great exertion, some swelling in the ankles and feet. Denies SOB        History of Present Illness:      Segundo Law is a 76 y.o. male with a history of hypertension, hyperlipidemia, atrial fibrillation, chronic kidney disease, renal cancer status post left nephrectomy, coronary artery disease status post stents/CABG, ongoing tobacco use, thoracic aortic aneurysm (4.5 cm), 8 mm left upper lobe nodule and AAA status post remote endovascular repair in 2012 [place] and redo endovascular AAA repair for right iliac endoleak on 7/20/2016 with Dr. Villegas (2019 measured 6.6 cm, 2020 5.9 cm) .  Patient presents today for follow-up of his AAA with CT abd/pelvis.   Last seen in the office on 2/22/2022 with stable 8 mm left lower lobe nodule and 4.5 cm ascending aortic aneurysm with plans for 6-month follow-up of his lung nodule.  Also noted was interval enlargement of his native AAA s/p redo EVAR from 5.9 cm to 6.1 cm on aortic ultrasound.  Plans for evaluation with CT abdomen pelvis.  Patient has not been able to receive contrast with CT scanning for several years secondary to chronic kidney disease/left nephrectomy with last creatinine 2.8 and GFR 33.  Since last office visit, patient has been feeling well and in good spirits.  He denies any unusual abdominal pain, back pain or flank pain.  He continues to smoke.      Review of Systems:  Review of Systems   Constitutional: Negative  for chills, decreased appetite, diaphoresis, fever, malaise/fatigue, night sweats and weight loss.   HENT: Negative for congestion, hoarse voice, sore throat and stridor.    Cardiovascular: Positive for irregular heartbeat (a -fib) and leg swelling (bilateral ankle swelling). Negative for chest pain, claudication, dyspnea on exertion, near-syncope, orthopnea, palpitations (a-fib), paroxysmal nocturnal dyspnea and syncope.   Respiratory: Negative for cough, hemoptysis, shortness of breath, sleep disturbances due to breathing, snoring, sputum production and wheezing.    Hematologic/Lymphatic: Positive for bleeding problem. Negative for adenopathy. Bruises/bleeds easily.   Skin: Negative for color change, dry skin, itching, poor wound healing and rash.   Musculoskeletal: Negative for arthritis, back pain, falls and muscle weakness.   Gastrointestinal: Negative for abdominal pain, anorexia, constipation, diarrhea, hematochezia, melena, nausea and vomiting.   Neurological: Negative for difficulty with concentration, disturbances in coordination, dizziness, loss of balance, numbness, seizures, vertigo and weakness.   Psychiatric/Behavioral: Negative for altered mental status, depression, memory loss and substance abuse. The patient does not have insomnia and is not nervous/anxious.    Allergic/Immunologic: Negative for persistent infections.       Past Medical History:    Past Medical History:   Diagnosis Date   • Cancer of kidney (HCC)    • Coronary artery disease    • Enlarged prostate    • Essential hypertension 11/21/2019   • Full dentures    • Hearing loss of both ears     from work    • Hyperlipidemia    • Pleurisy    • Pneumonia     h/o   • Wears glasses        Past Surgical History:    Past Surgical History:   Procedure Laterality Date   • ABDOMINAL AORTIC ANEURYSM REPAIR WITH ENDOGRAFT      Endologic graft, 7/17/12 in [place], Dr. Ej Spring   • AORTAGRAM N/A 7/20/2016    Procedure: Aortogram with possible  stenting ;  Surgeon: Lefty Villegas MD;  Location: [facility] HYBRID OR 15;  Service:    • ARTERIAL ANEURYSM REPAIR     • CHOLECYSTECTOMY     • COLONOSCOPY     • CORONARY ARTERY BYPASS GRAFT      x2   • CORONARY STENT PLACEMENT      1 stent    • ENDOSCOPY     • NEPHRECTOMY Left    • [place] AAA REPAIR,AORTO-AORTIC TUBE PROSTH N/A 2016    Procedure: Abdominal Aortic Aneurysm Repair With Endograft;  Surgeon: Lefty Villegas MD;  Location: [facility] HYBRID OR 15;  Service: Vascular   • TONSILLECTOMY         Allergies:  Allergies   Allergen Reactions   • Nexium [Esomeprazole Magnesium] Other (See Comments)     Rapid Heartrate and A-Fib   • Adhesive Tape Rash   • Latex Rash       Medications:      Current Outpatient Medications:   •  amLODIPine (NORVASC) 10 MG tablet, Take 1 tablet by mouth Daily., Disp: 90 tablet, Rfl: 0  •  carvedilol (COREG) 25 MG tablet, Take 1 tablet by mouth 2 (Two) Times a Day. NEED FOLLOW UP APPOINTMENT FOR FURTHER REFILLS, Disp: 180 tablet, Rfl: 0  •  CRESTOR 10 MG tablet, Take 10 mg by mouth daily., Disp: , Rfl: 0  •  DIGOX 125 MCG tablet, Take 125 mcg by mouth Daily., Disp: , Rfl: 0  •  fenofibrate (TRICOR) 145 MG tablet, Take 145 mg by mouth daily., Disp: , Rfl: 0  •  nitroglycerin (NITROSTAT) 0.4 MG SL tablet, PRN, Disp: , Rfl:   •  pantoprazole (PROTONIX) 40 MG EC tablet, Take 40 mg by mouth Daily., Disp: , Rfl:   •  PRADAXA 75 MG capsule, Take 75 mg by mouth 2 (two) times a day. Instructed to stop  per dr villegas office , Disp: , Rfl: 0  •  traZODone (DESYREL) 150 MG tablet, Take 150 mg by mouth every night at bedtime., Disp: , Rfl:   •  tamsulosin (FLOMAX) 0.4 MG capsule 24 hr capsule, Take 1 capsule by mouth Daily., Disp: , Rfl:     Physical Exam:  Physical Exam  Pulmonary:      Effort: Pulmonary effort is normal.   Neurological:      Mental Status: He is alert.   Psychiatric:         Mood and Affect: Mood normal.         Labs/Imagin2022 CT abdomen/pelvis without contrast: 5 x 4 mm  right lower lobe nodule.  No significant change in appearance of abdominal aorta comparison to prior CT abdomen from 2020, however unable to fully evaluate for leak or stenosis without contrast.  Patient is status post left nephrectomy    Assessment / Plan:  Diagnoses and all orders for this visit:    1. Abdominal aortic aneurysm without rupture status post repeat endograft 7/20/2016 (Primary)       Segundo Law is a 76 y.o. male with a history of hypertension, hyperlipidemia, atrial fibrillation, chronic kidney disease, renal cancer status post left nephrectomy, coronary artery disease status post stents/CABG, ongoing tobacco use, thoracic aortic aneurysm (4.5 cm), 8 mm left upper lobe nodule and AAA status post remote endovascular repair in 2012 [place] and redo endovascular AAA repair for right iliac endoleak on 7/20/2016 with Dr. Villegas (2019 measured 6.6 cm, 2020 5.9 cm) .  Discussed findings of recent CT abdomen/pelvis.  On personal review, native AAA measuring 6.3 cm and per radiology read is unchanged in 2020.  Previous radiology report from 2020 measuring native AAA at 5.9 cm.  Unfortunately, we do not have imaging to review for interval growth of native AAA and we will reach out to Jos Chatterjee to obtain disc.  It is also difficult to evaluate for endoleak secondary to the lack of contrast due to his history of CKD and left nephrectomy.  Last creatinine 2.8/GFR 33.  Pt is already scheduled in August for repeat CT chest for continued monitoring of lung nodule.  Current plan is to repeat CT abd/pelvis in 1 year for ongoing evaluation of AAA. Will contact pt if anything changes following review of 2020 CT abd/pelvis.      This visit has been rescheduled as a phone visit to comply with patient safety concerns in accordance with CDC recommendations. Total time of discussion was 15 minutes.     Addendum:  Reviewed recent CT abd/pelvis imaging 4/13/22 and in agreement with radiologist, native AAA measuring  6.3 cm which is unchanged since 2020 (on personal review 6.3 cm, previous radiology report from 2020 5.9 cm).  Preop native AAA 6.6 cm.  We will follow up with CT chest in August and 1 year with CT abd/pelvis.  At next office visit, will try and coordinate CT chest/abd/pelvis going forward.      SHANTANU Alonso  [facility] Cardiothoracic Surgery  05/12/22  09:25 EDT

## 2022-06-03 RX ORDER — CARVEDILOL 25 MG/1
TABLET ORAL
Qty: 180 TABLET | Refills: 0 | Status: SHIPPED | OUTPATIENT
Start: 2022-06-03 | End: 2022-11-28 | Stop reason: SDUPTHER

## 2022-07-05 DIAGNOSIS — I71.21 ANEURYSM OF ASCENDING AORTA: Primary | ICD-10-CM

## 2022-07-27 ENCOUNTER — TELEPHONE (OUTPATIENT)
Dept: CARDIAC SURGERY | Facility: CLINIC | Age: 77
End: 2022-07-27

## 2022-07-27 NOTE — TELEPHONE ENCOUNTER
Received call from Jos Nogueira, pts creatinine is elevated, pt has stage 3 renal failure. Ct chest w w/o changed to Ct Chest wo due to renal failure.

## 2022-08-11 ENCOUNTER — OFFICE VISIT (OUTPATIENT)
Dept: CARDIAC SURGERY | Facility: CLINIC | Age: 77
End: 2022-08-11

## 2022-08-11 DIAGNOSIS — I71.21 ANEURYSM OF ASCENDING AORTA: Primary | ICD-10-CM

## 2022-08-11 DIAGNOSIS — R91.1 LUNG NODULE: ICD-10-CM

## 2022-08-11 DIAGNOSIS — R91.1 LUNG NODULE: Primary | ICD-10-CM

## 2022-08-11 PROCEDURE — 99443 PR PHYS/QHP TELEPHONE EVALUATION 21-30 MIN: CPT | Performed by: NURSE PRACTITIONER

## 2022-08-11 RX ORDER — LISINOPRIL 20 MG/1
20 TABLET ORAL DAILY
COMMUNITY

## 2022-08-11 NOTE — PROGRESS NOTES
[facility] Cardiothoracic Surgery Office Telephone visit    You have chosen to receive care through a telephone visit. Do you consent to use a telephone visit for your medical care today? Yes    Date of Encounter: 08/11/2022     YOB: 1945  Name: Segundo Law    PCP: Nikolay Blevins MD    Chief Complaint:    Chief Complaint   Patient presents with   • Follow-up     6 month follow up for lung nodule and a thoracic aneurysm.       History of Present Illness:      Segundo Law is a 76 y.o. male with a history of hypertension, hyperlipidemia, atrial fibrillation, chronic kidney disease, renal cancer status post left nephrectomy, coronary artery disease status post stents/CABG, ongoing tobacco use, thoracic aortic aneurysm (4.5 cm), 8 mm left upper lobe nodule and AAA status post remote endovascular repair in 2012 [place] and redo endovascular AAA repair for right iliac endoleak on 7/20/2016 with Dr. Villegas (2019 measured 6.6 cm, 2020 5.9 cm) .  Pt presents today for follow up of TAA/pulmonary nodule.  Last seen in the office on 5/12/22.  At that time, pt with stable 6.3 cm AAA (6.6 cm native AAA) s/p EVAR which was limited for endoleak secondary to lack of contrast due to history of CKD/left nephrectomy.  Since last office visit, pt has be doing well.  He denies any cough or hemoptysis.  He does note a 20 pound weight loss over the last year with the loss of his wife and brother.  Patient is continuing to smoke.    Review of Systems:  Review of Systems   Constitutional: Positive for malaise/fatigue and weight loss. Negative for chills, decreased appetite, diaphoresis, fever and night sweats.   HENT: Negative.  Negative for congestion, hoarse voice, sore throat and stridor.    Cardiovascular: Positive for dyspnea on exertion and leg swelling. Negative for claudication, irregular heartbeat, near-syncope, orthopnea, palpitations, paroxysmal nocturnal dyspnea and syncope.   Respiratory:  Negative.  Negative for cough, hemoptysis, shortness of breath, sleep disturbances due to breathing, snoring, sputum production and wheezing.    Hematologic/Lymphatic: Negative for adenopathy and bleeding problem. Bruises/bleeds easily.   Skin: Negative.  Negative for color change, dry skin, itching, poor wound healing and rash.   Musculoskeletal: Negative.  Negative for arthritis, back pain, falls and muscle weakness.   Gastrointestinal: Negative.  Negative for abdominal pain, anorexia, constipation, diarrhea, hematochezia, melena, nausea and vomiting.   Neurological: Positive for dizziness, loss of balance and weakness. Negative for difficulty with concentration, disturbances in coordination, numbness, seizures and vertigo.   Psychiatric/Behavioral: Negative for altered mental status, depression, memory loss and substance abuse. The patient has insomnia and is nervous/anxious.    Allergic/Immunologic: Negative.  Negative for persistent infections.       Allergies:  Allergies   Allergen Reactions   • Nexium [Esomeprazole Magnesium] Other (See Comments)     Rapid Heartrate and A-Fib   • Adhesive Tape Rash   • Latex Rash       Medications:      Current Outpatient Medications:   •  amLODIPine (NORVASC) 10 MG tablet, Take 1 tablet by mouth Daily., Disp: 90 tablet, Rfl: 0  •  fenofibrate (TRICOR) 145 MG tablet, Take 145 mg by mouth daily., Disp: , Rfl: 0  •  lisinopril (PRINIVIL,ZESTRIL) 20 MG tablet, Take 20 mg by mouth Daily., Disp: , Rfl:   •  nitroglycerin (NITROSTAT) 0.4 MG SL tablet, PRN, Disp: , Rfl:   •  pantoprazole (PROTONIX) 40 MG EC tablet, Take 40 mg by mouth Daily., Disp: , Rfl:   •  PRADAXA 75 MG capsule, Take 75 mg by mouth 2 (two) times a day. Instructed to stop 7/18 per dr alegre office , Disp: , Rfl: 0  •  tamsulosin (FLOMAX) 0.4 MG capsule 24 hr capsule, Take 1 capsule by mouth Daily., Disp: , Rfl:   •  traZODone (DESYREL) 150 MG tablet, Take 150 mg by mouth every night at bedtime., Disp: , Rfl:   •   carvedilol (COREG) 25 MG tablet, TAKE 1 TABLET BY MOUTH TWICE DAILY. NEED FOLLOW UP APPOINTMENT FOR FURTHER REFILLS (Patient not taking: Reported on 8/11/2022), Disp: 180 tablet, Rfl: 0    Social History     Socioeconomic History   • Marital status:    • Number of children: 2   Tobacco Use   • Smoking status: Current Every Day Smoker     Packs/day: 1.00     Years: 55.00     Pack years: 55.00     Types: Cigarettes   • Smokeless tobacco: Never Used   Vaping Use   • Vaping Use: Never used   Substance and Sexual Activity   • Alcohol use: No   • Drug use: No   • Sexual activity: Defer       Family History   Problem Relation Age of Onset   • Heart failure Mother    • Other Father         was murdered in 1971   • Breast cancer Sister    • No Known Problems Brother    • No Known Problems Sister    • No Known Problems Sister    • No Known Problems Brother    • No Known Problems Brother    • No Known Problems Brother    • No Known Problems Brother        Past Medical History:   Diagnosis Date   • Cancer of kidney (HCC)    • Coronary artery disease    • Enlarged prostate    • Essential hypertension 11/21/2019   • Full dentures    • Hearing loss of both ears     from work    • Hyperlipidemia    • Pleurisy    • Pneumonia     h/o   • Wears glasses        Past Surgical History:   Procedure Laterality Date   • ABDOMINAL AORTIC ANEURYSM REPAIR WITH ENDOGRAFT      Endologic graft, 7/17/12 in [place], Dr. Ej Spring   • AORTAGRAM N/A 7/20/2016    Procedure: Aortogram with possible stenting ;  Surgeon: Lefty Villegas MD;  Location:  [facility] OR 15;  Service:    • ARTERIAL ANEURYSM REPAIR     • CHOLECYSTECTOMY     • COLONOSCOPY     • CORONARY ARTERY BYPASS GRAFT      x2   • CORONARY STENT PLACEMENT      1 stent    • ENDOSCOPY     • NEPHRECTOMY Left    • [place] AAA REPAIR,AORTO-AORTIC TUBE PROSTH N/A 7/20/2016    Procedure: Abdominal Aortic Aneurysm Repair With Endograft;  Surgeon: Lefty Villegas MD;  Location:  [facility]  OR 15;  Service: Vascular   • TONSILLECTOMY         I have reviewed the following portions of the patient's history: allergies, current medications, past family history, past medical history, past social history, past surgical history and problem list and confirm it's accurate.    Physical Exam:  Vital Signs:  There were no vitals filed for this visit.  There is no height or weight on file to calculate BMI.     Physical Exam  Pulmonary:      Effort: Pulmonary effort is normal.   Neurological:      Mental Status: He is alert.   Psychiatric:         Mood and Affect: Mood normal.         Labs/Imaging:    Jos Chatterjee 7/27/2022 CT chest without contrast: Unchanged 5 cm ascending thoracic aortic aneurysm with interval enlargement of left upper lobe nodule from 8 mm to 11 mm (spiculated concerning for malignancy) with new 7 mm right lower lobe pulmonary nodule.  There is an unchanged 5 to 6 mm right lower lobe pulmonary nodule along the fissure with mild left upper lobe nodular opacities.  Recommendation for PET scan    Personally reviewed    Assessment / Plan:  Diagnoses and all orders for this visit:    1. Aneurysm of ascending aorta (HCC) (Primary)    2. Lung nodule     Segundo Law is a 76 y.o. male with a history of hypertension, hyperlipidemia, atrial fibrillation, chronic kidney disease, renal cancer status post left nephrectomy, coronary artery disease status post stents/CABG, ongoing tobacco use, thoracic aortic aneurysm (4.5 cm), AAA status post remote endovascular repair in 2012 [place] and redo endovascular AAA repair for right iliac endoleak on 7/20/2016 with Dr. Villegas (2019 measured 6.6 cm, 2020 5.9 cm, 2022 6.3 cm without contrast) and 8 mm left upper lobe nodule .  Discussed finding of recent CT chest and on personal review unchanged 4.5 cm ascending aortic aneurysm and needs follow up in 1 year.  Interval enlargement of left upper lobe nodule from 8 mm to 1.1 cm with spiculated margins concerning  for malignancy and new 7 mm right lower lobe nodule with recommendation for PET scan.  We will proceed with scheduling PET scan and I will discuss CT-guided biopsy of left nodule with Dr. Villegas for his recommendations.  Pt does have a history of renal cancer and on CT abd/pelvis without contrast April 2022 hypodense nodule at right upper renal pole unchanged since 2020.  Unfortunately, pt is continuing to smoke.  Of note, pt will need follow up in May 2022 for history of EVAR and will try to coordinate with scans of chest going forward.      This visit has been rescheduled as a phone visit to comply with patient safety concerns in accordance with CDC recommendations. Total time of discussion was 21 minutes.    Addendum:  Discussed pt/imaging with Dr. Villegas.  He recommends proceeding with CT guided needle biopsy of GERRY nodule along with PET scan which we will get scheduled.      Malinda Cruz, APRJIM  [facility] Cardiothoracic Surgery

## 2022-09-07 ENCOUNTER — PREP FOR SURGERY (OUTPATIENT)
Dept: OTHER | Facility: HOSPITAL | Age: 77
End: 2022-09-07

## 2022-09-07 DIAGNOSIS — R91.1 LUNG NODULE: Primary | ICD-10-CM

## 2022-09-16 ENCOUNTER — TELEPHONE (OUTPATIENT)
Dept: CARDIAC SURGERY | Facility: CLINIC | Age: 77
End: 2022-09-16

## 2022-09-16 NOTE — TELEPHONE ENCOUNTER
Our central scheduling department and I have tried contacting Mr. Law multiple times regarding a CT Guided Needle biopsy. I left another  for him this morning.

## 2022-09-21 ENCOUNTER — HOSPITAL ENCOUNTER (OUTPATIENT)
Dept: PET IMAGING | Facility: HOSPITAL | Age: 77
Discharge: HOME | End: 2022-09-21

## 2022-09-21 DIAGNOSIS — R91.1 LUNG NODULE: ICD-10-CM

## 2022-09-21 LAB — GLUCOSE BLDC GLUCOMTR-MCNC: 117 MG/DL (ref 70–130)

## 2022-09-21 PROCEDURE — 82962 GLUCOSE BLOOD TEST: CPT

## 2022-09-21 PROCEDURE — A9552 F18 FDG: HCPCS | Performed by: NURSE PRACTITIONER

## 2022-09-21 PROCEDURE — 0 FLUDEOXYGLUCOSE F18 SOLUTION: Performed by: NURSE PRACTITIONER

## 2022-09-21 PROCEDURE — 78815 PET IMAGE W/CT SKULL-THIGH: CPT

## 2022-09-21 RX ADMIN — FLUDEOXYGLUCOSE F18 1 DOSE: 300 INJECTION INTRAVENOUS at 14:35

## 2022-10-13 RX ORDER — SODIUM CHLORIDE 0.9 % (FLUSH) 0.9 %
10 SYRINGE (ML) INJECTION AS NEEDED
Status: CANCELLED | OUTPATIENT
Start: 2022-10-13

## 2022-10-13 RX ORDER — SODIUM CHLORIDE 0.9 % (FLUSH) 0.9 %
3 SYRINGE (ML) INJECTION EVERY 12 HOURS SCHEDULED
Status: CANCELLED | OUTPATIENT
Start: 2022-10-13

## 2022-10-14 ENCOUNTER — HOSPITAL ENCOUNTER (OUTPATIENT)
Dept: CT IMAGING | Facility: HOSPITAL | Age: 77
Discharge: HOME | End: 2022-10-14

## 2022-10-26 RX ORDER — SODIUM CHLORIDE 0.9 % (FLUSH) 0.9 %
3 SYRINGE (ML) INJECTION EVERY 12 HOURS SCHEDULED
Status: CANCELLED | OUTPATIENT
Start: 2022-10-26

## 2022-10-26 RX ORDER — SODIUM CHLORIDE 0.9 % (FLUSH) 0.9 %
10 SYRINGE (ML) INJECTION AS NEEDED
Status: CANCELLED | OUTPATIENT
Start: 2022-10-26

## 2022-10-27 ENCOUNTER — HOSPITAL ENCOUNTER (OUTPATIENT)
Dept: CT IMAGING | Facility: HOSPITAL | Age: 77
Discharge: HOME | End: 2022-10-27

## 2022-11-23 ENCOUNTER — TELEPHONE (OUTPATIENT)
Dept: CARDIOLOGY | Facility: CLINIC | Age: 77
End: 2022-11-23

## 2022-11-23 NOTE — TELEPHONE ENCOUNTER
Pt requesting Coreg refills, no appt scheduled, notified office  [place]. Pt has not been seen with AA since 2020. Pt last labs from 2019, faxing PCP for update labs.    Ok to refill?

## 2022-11-28 RX ORDER — CARVEDILOL 25 MG/1
TABLET ORAL
Qty: 60 TABLET | Refills: 0 | Status: SHIPPED | OUTPATIENT
Start: 2022-11-28

## 2022-11-28 NOTE — TELEPHONE ENCOUNTER
Okay to give 30-day refill until he is scheduled.  He will need to make an appointment with us.  Thank you.

## 2023-01-03 ENCOUNTER — OFFICE VISIT (OUTPATIENT)
Dept: CARDIAC SURGERY | Facility: CLINIC | Age: 78
End: 2023-01-03
Payer: MEDICARE

## 2023-01-03 VITALS
BODY MASS INDEX: 22.02 KG/M2 | HEIGHT: 72 IN | WEIGHT: 162.6 LBS | TEMPERATURE: 97.8 F | OXYGEN SATURATION: 98 % | DIASTOLIC BLOOD PRESSURE: 88 MMHG | HEART RATE: 83 BPM | SYSTOLIC BLOOD PRESSURE: 200 MMHG

## 2023-01-03 DIAGNOSIS — N18.32 STAGE 3B CHRONIC KIDNEY DISEASE: ICD-10-CM

## 2023-01-03 DIAGNOSIS — R91.1 LUNG NODULE: Primary | ICD-10-CM

## 2023-01-03 DIAGNOSIS — I71.21 ANEURYSM OF ASCENDING AORTA WITHOUT RUPTURE: ICD-10-CM

## 2023-01-03 DIAGNOSIS — I25.10 CORONARY ARTERY DISEASE INVOLVING NATIVE CORONARY ARTERY OF NATIVE HEART WITHOUT ANGINA PECTORIS: ICD-10-CM

## 2023-01-03 DIAGNOSIS — I71.43 INFRARENAL ABDOMINAL AORTIC ANEURYSM (AAA) WITHOUT RUPTURE: ICD-10-CM

## 2023-01-03 PROCEDURE — 99214 OFFICE O/P EST MOD 30 MIN: CPT | Performed by: NURSE PRACTITIONER

## 2023-01-03 PROCEDURE — 1160F RVW MEDS BY RX/DR IN RCRD: CPT | Performed by: NURSE PRACTITIONER

## 2023-01-03 PROCEDURE — 1159F MED LIST DOCD IN RCRD: CPT | Performed by: NURSE PRACTITIONER

## 2023-01-03 PROCEDURE — 71046 X-RAY EXAM CHEST 2 VIEWS: CPT | Performed by: NURSE PRACTITIONER

## 2023-01-03 NOTE — PROGRESS NOTES
[facility] Cardiothoracic Surgery Office Follow Up Note     Date of Encounter: 2023     Name: Segundo Law  : 1945     Referred By: No ref. provider found  PCP: Nikolay Blevins MD    Chief Complaint:    Chief Complaint   Patient presents with   • Lung Nodule     Follow up with PET and ct needle biopsy for lung nodule        Subjective      History of Present Illness:   Segundo Law is a 77 y.o. male history of hypertension, hyperlipidemia, atrial fibrillation, chronic kidney disease, renal cancer status post left nephrectomy, coronary artery disease status post stents/CABG, ongoing tobacco use, thoracic aortic aneurysm (4.5 cm), 8 mm left upper lobe nodule and AAA status post remote endovascular repair in  [place] and redo endovascular AAA repair for right iliac endoleak on 2016 with Dr. Villegas ( measured 6.6 cm,  5.9 cm).      Pt presented 22 for follow up of TAA/pulmonary nodule. The ascending thoracic aorta was noted to be unchanged/ stable @ 5 cm but interval enlargement of GERRY nodule from 8 to 11 mm (spiculated concerning for malignancy) with new 7 mm RLL pulmonary nodule, and unchanged 5-6 mm RLL pulmonary nodule along the fissure.  Patient also noted to have stable 6.3 cm AAA (6.6 cm native AAA) s/p EVAR (study limited for endoleak secondary to lack of contrast due to history of CKD/left nephrectomy).      NM PET was recommended as well as CT guided needle biopsy  GERRY nodule per Dr. Villegas.  He is here to follow up on those studies today.  Rather than CT guided needle biopsy, patient underwent EBUS @ [facility] 22 per Dr. Nikos Delong.  NM PET was completed 22.  Mr. Law c/o pain left lower anterior chest since procedure 22.    Review of Systems:  Review of Systems   Constitutional: Negative for chills, decreased appetite, diaphoresis, fever, malaise/fatigue, night sweats, weight gain and weight loss.   HENT: Negative for hoarse  voice.    Eyes: Negative for blurred vision, double vision and visual disturbance.   Cardiovascular: Negative for chest pain, claudication, dyspnea on exertion, irregular heartbeat, leg swelling, near-syncope, orthopnea, palpitations, paroxysmal nocturnal dyspnea and syncope.   Respiratory: Negative for cough, hemoptysis, shortness of breath, sputum production and wheezing.    Hematologic/Lymphatic: Negative for adenopathy and bleeding problem. Does not bruise/bleed easily.   Skin: Negative for color change, nail changes, poor wound healing and rash.   Musculoskeletal: Negative for back pain, falls and muscle cramps.   Gastrointestinal: Negative for abdominal pain, dysphagia and heartburn.   Genitourinary: Negative for flank pain.   Neurological: Negative for brief paralysis, disturbances in coordination, dizziness, focal weakness, headaches, light-headedness, loss of balance, numbness, paresthesias, sensory change, vertigo and weakness.   Psychiatric/Behavioral: Negative for depression and suicidal ideas.   Allergic/Immunologic: Negative for persistent infections.       I have reviewed the following portions of the patient's history: allergies, current medications, past family history, past medical history, past social history, past surgical history, problem list and ROS and confirm it's accurate.    Allergies:  Allergies   Allergen Reactions   • Nexium [Esomeprazole Magnesium] Other (See Comments)     Rapid Heartrate and A-Fib   • Adhesive Tape Rash   • Latex Rash       Medications:      Current Outpatient Medications:   •  amLODIPine (NORVASC) 10 MG tablet, Take 1 tablet by mouth Daily., Disp: 90 tablet, Rfl: 0  •  carvedilol (COREG) 25 MG tablet, Take 1 tablet by mouth twice Daily. Need appt for further refills., Disp: 60 tablet, Rfl: 0  •  fenofibrate (TRICOR) 145 MG tablet, Take 145 mg by mouth daily., Disp: , Rfl: 0  •  lisinopril (PRINIVIL,ZESTRIL) 20 MG tablet, Take 20 mg by mouth Daily., Disp: , Rfl:   •   nitroglycerin (NITROSTAT) 0.4 MG SL tablet, PRN, Disp: , Rfl:   •  pantoprazole (PROTONIX) 40 MG EC tablet, Take 40 mg by mouth Daily., Disp: , Rfl:   •  PRADAXA 75 MG capsule, Take 75 mg by mouth 2 (two) times a day. Instructed to stop 7/18 per dr villegas office , Disp: , Rfl: 0  •  tamsulosin (FLOMAX) 0.4 MG capsule 24 hr capsule, Take 1 capsule by mouth Daily., Disp: , Rfl:   •  traZODone (DESYREL) 150 MG tablet, Take 150 mg by mouth every night at bedtime., Disp: , Rfl:     History:   Past Medical History:   Diagnosis Date   • Cancer of kidney (HCC)    • Coronary artery disease    • Enlarged prostate    • Essential hypertension 11/21/2019   • Full dentures    • Hearing loss of both ears     from work    • Hyperlipidemia    • Pleurisy    • Pneumonia     h/o   • Wears glasses        Past Surgical History:   Procedure Laterality Date   • ABDOMINAL AORTIC ANEURYSM REPAIR WITH ENDOGRAFT      Endologic graft, 7/17/12 in [place], Dr. Ej Spring   • AORTAGRAM N/A 7/20/2016    Procedure: Aortogram with possible stenting ;  Surgeon: Lefty Villegas MD;  Location: [place] [address];  Service:    • ARTERIAL ANEURYSM REPAIR     • CHOLECYSTECTOMY     • COLONOSCOPY     • CORONARY ARTERY BYPASS GRAFT      x2   • CORONARY STENT PLACEMENT      1 stent    • ENDOSCOPY     • NEPHRECTOMY Left    • [place] AAA REPAIR,AORTO-AORTIC TUBE PROSTH N/A 7/20/2016    Procedure: Abdominal Aortic Aneurysm Repair With Endograft;  Surgeon: Lefty Villegas MD;  Location: [place] [address];  Service: Vascular   • TONSILLECTOMY         Social History     Socioeconomic History   • Marital status:    • Number of children: 2   Tobacco Use   • Smoking status: Every Day     Packs/day: 1.00     Years: 55.00     Pack years: 55.00     Types: Cigarettes   • Smokeless tobacco: Never   Vaping Use   • Vaping Use: Never used   Substance and Sexual Activity   • Alcohol use: No   • Drug use: No   • Sexual activity: Defer        Family History   Problem  Relation Age of Onset   • Heart failure Mother    • Other Father         was murdered in 1971   • Breast cancer Sister    • No Known Problems Brother    • No Known Problems Sister    • No Known Problems Sister    • No Known Problems Brother    • No Known Problems Brother    • No Known Problems Brother    • No Known Problems Brother        Objective   Physical Exam:  Vitals:    01/03/23 1358   BP: (!) 200/88   BP Location: Right arm   Patient Position: Sitting   Pulse: 83   Temp: 97.8 °F (36.6 °C)   SpO2: 98%   Weight: 73.8 kg (162 lb 9.6 oz)   Height: 182.9 cm (72\")      Body mass index is 22.05 kg/m².    Physical Exam  Vitals reviewed.   Constitutional:       General: He is not in acute distress.     Appearance: He is well-developed and well-groomed. He is not toxic-appearing.      Comments: Weight unchanged as compared to weight 3/3/22.   HENT:      Head: Normocephalic and atraumatic.   Eyes:      Conjunctiva/sclera: Conjunctivae normal.      Pupils: Pupils are equal, round, and reactive to light.   Neck:      Vascular: No carotid bruit.      Comments: No palpable nodules or tenderness  Cardiovascular:      Rate and Rhythm: Normal rate and regular rhythm.      Pulses:           Dorsalis pedis pulses are 2+ on the right side and 2+ on the left side.        Posterior tibial pulses are 2+ on the right side and 2+ on the left side.      Heart sounds: S1 normal and S2 normal.   Pulmonary:      Effort: Pulmonary effort is normal.      Breath sounds: Examination of the right-upper field reveals wheezing. Examination of the left-upper field reveals wheezing. Examination of the right-middle field reveals wheezing. Examination of the left-middle field reveals wheezing. Examination of the right-lower field reveals wheezing. Examination of the left-lower field reveals wheezing. Wheezing present. No rhonchi or rales.      Comments: Low pitch expiratory wheezing throughout both lung fields.  No palpable chest wall  tenderness  Musculoskeletal:      Cervical back: Normal range of motion and neck supple.   Lymphadenopathy:      Cervical: No cervical adenopathy.      Upper Body:      Right upper body: No supraclavicular adenopathy.      Left upper body: No supraclavicular adenopathy.   Skin:     General: Skin is warm and dry.      Capillary Refill: Capillary refill takes less than 2 seconds.   Neurological:      Mental Status: He is alert and oriented to person, place, and time.   Psychiatric:         Attention and Perception: Attention normal.         Behavior: Behavior normal. Behavior is cooperative.       Imaging/Labs:  DATE OF EXAM: 9/21/2022 2:36 PM     PROCEDURE: NM PET/CT SKULL BASE TO MID THIGH-   INDICATIONS: LUNG NODULE; R91.1-Solitary pulmonary nodule   COMPARISON: No prior PET/CT. Images correlated with outside CT performed  7/27/2022      HEAD AND NECK: Physiologic hypermetabolism of the aerodigestive tract  structures is present, without hypermetabolic cervical adenopathy or  aerodigestive tract mass. Normal hypermetabolism of the cerebral  hemispheres. A 10 mm nodule near the junction of the superficial and  deep parotid lobes demonstrates FDG hypermetabolism with maximum SUV  6.4.     CHEST: There is physiologic hypermetabolism of the left ventricular  myocardium. A presumed pulmonary nodule of interest in the left upper  lobe demonstrates concerning hypermetabolism with maximum SUV 3.2. There  is no adjacent hypermetabolic the spinal adenopathy or contralateral  pulmonary nodularity.     ABDOMEN AND PELVIS: There is physiologic activity of the  gastrointestinal and genitourinary tracts, without focal hypermetabolism  concerning for acute or neoplastic process. A large, previously stented  aortic aneurysm appears similar to prior outside comparison CT. Osseous  structures are unremarkable diffusely.     IMPRESSION:  Spiculated presumed 11 mm pulmonary nodule of interest in the left upper  lobe demonstrates  concerning FDG hypermetabolism with maximum SUV 3.2,  concerning for primary neoplasm. There is no evidence of adjacent  hypermetabolic mediastinal adenopathy or distant metastasis otherwise.     Hypermetabolic 10 mm right parotid nodule. This finding is very unlikely  new metastatic and favors a primary parotid neoplasm, possible Warthin's  for example.   This report was finalized on 9/23/2022 10:16 AM by Ge Low.     CXR 1/3/23 personally reviewed: visible spiculated lung nodule GERRY consistent with prior films.  Radiology review pending.      EXTERNAL MEDICAL RECORDS - SCAN - [facility] Left Lung FNA/bronch brush Pathology 12/22/2022 (12/27/2022)    EXTERNAL MEDICAL RECORDS - SCAN - [facility]/CT CHEST W/O CONTRAST, H&P/12.22.22 (12/22/2022)    Assessment / Plan      Assessment / Plan:  1. Lung nodule (Primary)  -77 y.o. male history of hypertension, hyperlipidemia, atrial fibrillation, chronic kidney disease, renal cancer status post left nephrectomy, coronary artery disease status post stents/CABG, ongoing tobacco use, thoracic aortic aneurysm (4.5 cm), 8 mm left upper lobe nodule and AAA status post remote endovascular repair in 2012 [place] and redo endovascular AAA repair for right iliac endoleak on 7/20/2016 with Dr. Villegas (2019 measured 6.6 cm, 2020 5.9 cm).    - Per CT review 8/11/22: Interval enlargement of GERRY nodule from 8 to 11 mm (spiculated concerning for malignancy) with new 7 mm RLL pulmonary nodule, and unchanged 5-6 mm RLL pulmonary nodule along the fissure.    - NM PET completed 9/21/22: Spiculated 11 mm pulmonary nodule GERRY noted to be hypermetabolic w/ SUV 3.2 concerning for neoplasm.  No associated hypermetabolic mediastinal adenopathy.    - Hypermetabolic 10 mm right parotid nodule.  Per Radiology unlikely new metastatin but favors primary parotid neoplasm or possible Warthin's.  - CT guided needle biopsy GERRY nodule not performed  - EBUS @ [facility] per   Sindi:  Fine needle aspiration LLL negative for malignancy, fine needle aspiration right paratracheal lymph node unsatisfactory for evaluation, bronchial brushing resulted scant cellularity and negative for malignancy, tissue biopsy LLL noted respiratory mucosa w/ fragments of hemorrhagic alveolated lung parenchyma.    - Reviewed NM PET and EBUS findings w/ Dr. Villegas  - Dr. Villegas recommends the following: PFT's, CT guided needle biopsy GERRY nodule, ENT referral for biopsy of right parotid nodule, then clinic revisit  - Patient and son verbalized understanding of abnormal NM PET, poor yield of information from EBUS, and need for further evaluation/ clinic revisit    2. Infrarenal abdominal aortic aneurysm (AAA) without rupture  - AAA status post remote endovascular repair in 2012 [place] and redo endovascular AAA repair for right iliac endoleak on 7/20/2016 with Dr. Villegas (2019 measured 6.6 cm, 2020 5.9 cm).    - Stable 6.3 cm AAA (6.6 cm native AAA) s/p EVAR (study limited for endoleak secondary to lack of contrast due to history of CKD/left nephrectomy) per CT 7/28/22    3. Aneurysm of ascending aorta without rupture  - Follow up 8/11/22  TAA.   The ascending thoracic aorta was noted to be unchanged/ stable @ 5 cm     4. Coronary artery disease involving native coronary artery of native heart without angina pectoris  - Follows with Dr. Dominguez  - Clinic follow up set for 2/28/23    5. Stage 3b chronic kidney disease (HCC)  - Most recent eGFR 22 (per BUN/creatine draw 7/27/22)      Patient Education:Segundo ROSA Thanh  reports that he has been smoking cigarettes. He has a 55.00 pack-year smoking history. He has never used smokeless tobacco.. I have educated him on the risk of diseases from using tobacco products such as cancer, COPD, heart disease and PVD.     I advised him to quit and he is not willing to quit.I spent 3  minutes counseling the patient.        Follow Up:   Return in about 4 weeks (around 1/31/2023)  for CT guided needle biopsy GERRY lung nodule, ENT eval rigth parotid nodule, and PFT\"s.   Or sooner for any further concerns or worsening sign and symptoms. If unable to reach us in the office please dial 911 or go to the nearest emergency department.      SHANTANU Grimes  [facility] Cardiothoracic Surgery    Time Spent: I spent 45 minutes caring for Segundo on this date of service. This time includes time spent by me in the following activities: preparing for the visit, reviewing tests, obtaining and/or reviewing a separately obtained history, performing a medically appropriate examination and/or evaluation, counseling and educating the patient/family/caregiver, ordering medications, tests, or procedures, referring and communicating with other health care professionals, documenting information in the medical record, independently interpreting results and communicating that information with the patient/family/caregiver and care coordination.

## 2023-01-17 ENCOUNTER — TELEPHONE (OUTPATIENT)
Dept: INFUSION THERAPY | Facility: HOSPITAL | Age: 78
End: 2023-01-17
Payer: MEDICARE

## 2023-01-17 NOTE — TELEPHONE ENCOUNTER
1-12-23/1623- Pt on schedule for next week, noted pt on blood thinner. Called pt to verify appointment. Pt states he cancelled appointment and is not going to have this done at this time.    Notified radiology scheduling staff and SHANTANU Shaw.

## 2023-01-19 ENCOUNTER — APPOINTMENT (OUTPATIENT)
Dept: CT IMAGING | Facility: HOSPITAL | Age: 78
End: 2023-01-19
Payer: MEDICARE

## 2023-01-27 ENCOUNTER — TELEPHONE (OUTPATIENT)
Dept: CARDIAC SURGERY | Facility: CLINIC | Age: 78
End: 2023-01-27
Payer: MEDICARE

## 2023-01-27 NOTE — TELEPHONE ENCOUNTER
I spoke to pt today - he was scheduled to come back to our office.. I called pt to check on some of his requested follow up care.  Such as ENT referral and ct guided bx.  He says he has not had any of this done yet.    He says he has had to wait as he has been seeing Nephrology and Pulmonology.    He asks to wait on this appt with us in follow up until he can work out things with these other providers.  He does NOT want bx or ENT referral at this time.   I did call his nephrologist - Dr. Santos and requested records  - I was told pt was last seen 1/12/23  I also called his pulmonologist Dr. Randolph and requested those records and pt was last seen 1/9/23.    Patient stated he would call us back to reschedule appt here after he follows with them next month.

## 2023-04-24 DIAGNOSIS — I71.40 ABDOMINAL AORTIC ANEURYSM (AAA) WITHOUT RUPTURE, UNSPECIFIED PART: Primary | ICD-10-CM

## 2023-05-10 DIAGNOSIS — I71.40 ABDOMINAL AORTIC ANEURYSM (AAA) WITHOUT RUPTURE, UNSPECIFIED PART: ICD-10-CM

## 2023-06-15 DIAGNOSIS — R91.1 LUNG NODULE: Primary | ICD-10-CM

## 2023-08-04 ENCOUNTER — TELEPHONE (OUTPATIENT)
Dept: INFUSION THERAPY | Facility: HOSPITAL | Age: 78
End: 2023-08-04
Payer: MEDICARE

## 2023-08-07 ENCOUNTER — TELEPHONE (OUTPATIENT)
Dept: CARDIAC SURGERY | Facility: CLINIC | Age: 78
End: 2023-08-07
Payer: MEDICARE

## 2023-08-07 NOTE — TELEPHONE ENCOUNTER
"Telephone follow up w/ Mr. Segundo Law regarding multiple missed clinic appointments and missed lung nodule biopsy:     Mr. Law stated he is unable to come to [place] for his healthcare.  He states his son is his medical POA \"but he is having trouble\".  Advised Mr Law that delay in care for follow up/ biopsy/ and potential treatment plan involving lung nodule and parotid nodule may lead to disease advancement, poor prognosis, and limited treatment options.  Also advised Dr. Villegas cannot follow his aneurysm disease without in person visits, either.      Mr. Law requested I contact his (new) PCP, Dr. Ramana Davis, to coordinate follow up with specialists in [facility] which is easier travel for him.      Will contact PCP to develop a plan for patient hand off regarding lung nodule, CAD, thoracic aortis aneurysm, AAA s/p EVAR, and parotid nodule.     Suzy FOURNIER  "